# Patient Record
Sex: MALE | Race: WHITE | HISPANIC OR LATINO | Employment: FULL TIME | URBAN - METROPOLITAN AREA
[De-identification: names, ages, dates, MRNs, and addresses within clinical notes are randomized per-mention and may not be internally consistent; named-entity substitution may affect disease eponyms.]

---

## 2017-03-15 ENCOUNTER — ALLSCRIPTS OFFICE VISIT (OUTPATIENT)
Dept: OTHER | Facility: OTHER | Age: 42
End: 2017-03-15

## 2017-03-17 ENCOUNTER — GENERIC CONVERSION - ENCOUNTER (OUTPATIENT)
Dept: OTHER | Facility: OTHER | Age: 42
End: 2017-03-17

## 2017-04-27 ENCOUNTER — ALLSCRIPTS OFFICE VISIT (OUTPATIENT)
Dept: OTHER | Facility: OTHER | Age: 42
End: 2017-04-27

## 2017-04-27 ENCOUNTER — GENERIC CONVERSION - ENCOUNTER (OUTPATIENT)
Dept: OTHER | Facility: OTHER | Age: 42
End: 2017-04-27

## 2017-12-19 ENCOUNTER — GENERIC CONVERSION - ENCOUNTER (OUTPATIENT)
Dept: OTHER | Facility: OTHER | Age: 42
End: 2017-12-19

## 2018-01-10 NOTE — RESULT NOTES
Verified Results  VAS CAROTID COMPLETE STUDY [id number] 10:17AM Samantha Ritter Order Number: [id number]     Test Name Result Flag Reference   VAS CAROTID COMPLETE STUDY (Report)     [facility]PORT   CLINICAL:   Indications: Transient cerebral ischemic attack, unspecified [G45 9]  Patient presents with a recent suspected blood clot in the brain resulting in a   transient ischemic attack  There was a PFO in his heart noted on alternative   testing; however, physician wants to confirm patency of carotid arteries  Operative History   Patient denies any cardiovascular surgery   Risk Factors   The patient has history of hyperlipidemia  Clinical   Right Pressure: 110/82 mm Hg, Left Pressure: 118/80 mm Hg  FINDINGS:      Right    Impression PSV EDV (cm/s) Direction of Flow Ratio    Dist  ICA         57     24           0 78    Mid  ICA         56     29           0 76    Prox  ICA  Normal    45     20           0 62    Dist CCA         98     28                 Mid CCA          73     17           0 73    Prox CCA         101     17                 Ext Carotid        82     14           1 12    Prox Vert         37     12 Antegrade            Subclavian        117      0                    Left     Impression PSV EDV (cm/s) Direction of Flow Ratio    Dist  ICA         54     24           0 61    Mid  ICA         63     32           0 73    Prox  ICA  Normal    47     21           0 53    Dist CCA         106     25                 Mid CCA          87     21           1 08    Prox CCA         81     21                 Ext Carotid       110     18           1 26    Prox Vert         32     12 Antegrade            Subclavian        141      0                          CONCLUSION:   Impression   RIGHT:   There is no evidence of significant stenosis noted  Vertebral artery flow is antegrade  There is no significant subclavian artery   disease     LEFT:   There is no evidence of significant stenosis noted    Vertebral artery flow is antegrade  There is no significant subclavian artery   disease        SIGNATURE:   Electronically Signed by: Jim Murray MD on 2016-03-08 03:39:43 PM

## 2018-01-13 NOTE — RESULT NOTES
Verified Results  VAS LOWER LIMB VENOUS DUPLEX STUDY, COMPLETE BILATERAL [id number] 08:55AM David Fee Order Number: [id number]     Test Name Result Flag Reference   VAS LOWER LIMB VENOUS DUPLEX STUDY, COMPLETE BILATERAL (Report)     [facility] REPORT   CLINICAL:   Indications: E78 5, Q21 1, I63 9   Patient states he had an embolic stroke  He reports they told him he has a   small hole in his heart  He does not have any complaint of leg pain or swelling  Operative History:   Patient denies any cardiovascular surgery      FINDINGS:      Segment Right      Left          Impression    Impression       CFV   Normal (Patent) Normal (Patent)             CONCLUSION:   Impression:   RIGHT LOWER LIMB:   No evidence of acute or chronic deep vein thrombosis  No evidence of superficial thrombophlebitis noted  Doppler evaluation shows a normal response to augmentation maneuvers  Popliteal, posterior tibial and anterior tibial arterial Doppler waveforms are   triphasic  LEFT LOWER LIMB:   No evidence of acute or chronic deep vein thrombosis  No evidence of superficial thrombophlebitis noted  Doppler evaluation shows a normal response to augmentation maneuvers  Popliteal, posterior tibial and anterior tibial arterial Doppler waveforms are   triphasic        SIGNATURE:   Electronically Signed by: Don José MD on 2016-03-31 11:02:28 AM

## 2018-01-14 NOTE — PROCEDURES
Procedures by Ernie Solorzano MD at 3/11/2016   9:34 AM      Author:  Ernie Solorzano MD Service:  Electrophysiology Author Type:  Physician     Filed:  3/11/2016  9:37 AM Date of Service:  3/11/2016  9:34 AM Status:  Signed     :  Ernie Solorzano MD (Physician)            Procedures      PP  LINQ    History and physical were reviewed  Patient was examined and history was reviewed  No change in patient's condition Since history and physical has been completed        The pre- operative diagnosis:    Cryptogenic Stroke    Postoperative diagnosis:    Cryptogenic stroke    Procedure:    LINQ      Surgeon: Ernie Solorzano    Assistants -none    Specimens - none    Estimated blood loss- 1 ml    Findings-none    Complications none    Anesthesia-  local lidocaine by myself      Details of the device      Sensing =0 67 mv      Description of procedure: The patient was seen before the procedure  The details of the procedure was explained and patient agreed to the same  Appropriate consent was signed  The patient was brought to the electrophysiologic laboratory  Proper time out was done  Sterile dressing and draping was done  Local lidocaine was infiltrated, in the left fourth intercostal space, about 2 cm lateral to the sternal edge  Using the stab knife an incision was made  Thereafter the  was used, moved around to form a pocket, along the long axis of the heart, in the fourth intercostal space region  Then plunger was used to deploy the device  The plunger was disengaged  and removed  The  was pulled back  Pressure was held and hemostasis was obtained  Dressing was done with Steri-Strips, Telfa and Tegaderm      Summary of the procedure: The patient came in to the laboratory for linq device placement  The device has been placed and patient tolerated the procedure well                         Angeles THEODORE    Mar 11 2016  9:38AM Dorinda Faulkner Standard Time

## 2018-01-16 NOTE — RESULT NOTES
Message   Has Melvina Bartholomew spoke to cardiology about having an implantable loop recorder placed? Verified Results  MRA CAROTIDS WO CONTRAST [id number] 03:07PM Meghan Caceres Order Number: [id number]   Performing Comments: WITH DISSECTION PROTOCOL PLEASE   - Patient Instructions: To schedule this appointment, please contact Central Scheduling at [phone]  Test Name Result Flag Reference   MRA CAROTIDS WO CONTRAST (Report)     MRA NECK WITHOUT CONTRAST     INDICATION: Stroke 3 weeks ago  COMPARISON: None  TECHNIQUE: 2D Time of Flight angiography with 3D reconstructions  Axial T1 fat sat  IMAGE QUALITY: Diagnostic  FINDINGS:     AORTIC ARCH: Normal      VISUALIZED SUBCLAVIAN ARTERIES: The subclavian arteries demonstrate normal flow-related enhancement with no stenosis  VERTEBRAL ARTERIES: Normal vertebral artery origins  The vertebral arteries are bilaterally symmetric with normal enhancement and no evidence of stenosis  Normal vertebral basilar junction  RIGHT CAROTID ARTERY: Normal common carotid artery, cervical internal carotid artery and external carotid artery  No stenosis at the bifurcation  LEFT CAROTID ARTERY: Normal common carotid artery, cervical internal carotid artery and external carotid artery  No stenosis at the bifurcation  VISUALIZED INTRACRANIAL VASCULATURE: Limited visualization of the intracranial vasculature is grossly unremarkable  NASCET criteria was used to determine the degree of internal carotid artery diameter stenosis  IMPRESSION:     Unremarkable MR angiogram of the cervical vasculature  Workstation performed: [id number]     Signed by:   Jeni Mcgraw DO   3/10/16     * MRA HEAD WO CONTRAST [id number] 03:05PM Meghan Caceres Order Number: [id number]     Test Name Result Flag Reference   MRA HEAD WO CONTRAST (Report)     MRA BRAIN     INDICATION: Stroke 3 weeks ago       COMPARISON: Prior MRI brain February 21, 2016 TECHNIQUE: Axial 3-D time-of-flight imaging with 3-D reconstructions  FINDINGS:     IMAGE QUALITY: Diagnostic  ANATOMY     INTERNAL CAROTID ARTERIES: Normal flow related enhancement of the distal cervical, petrous and cavernous segments of the internal carotid arteries  Normal ICA terminus  ANTERIOR CIRCULATION: Normal A1 segments  Normal anterior communicating artery  Normal flow-related enhancement of the anterior cerebral arteries  MIDDLE CEREBRAL ARTERY CIRCULATION: The M1 segment and middle cerebral artery branches demonstrate normal flow-related enhancement  DISTAL VERTEBRAL ARTERIES: Distal vertebral arteries are patient with a normal vertebrobasilar junction  The posterior inferior cerebellar artery origins are normal       BASILAR ARTERY: Normal      POSTERIOR CEREBRAL ARTERIES: Both posterior cerebral arteries arises from the basilar tip  Both arteries demonstrate normal flow-related enhancement  IMPRESSION:     No focal intracranial stenosis or aneurysm  Workstation performed: [id number]     Signed by:   Mandy Petit DO   3/10/16     (8) [facility] ANALYSIS [id number] 08:26AM Shikha Hicks   Performed at:  [address] [place] [address]  [id number]  : Jami Mcqueen MD, Phone:  [phone]     Test Name Result Flag Reference   PROTHROMBIN [id number] MUTATION Comment     NEGATIVENo mutation identified  Comment:A point mutation ([id number]) in the factor II (prothrombin) gene is thesecond most common cause of inherited thrombophilia  The incidence ofthis mutation in the [place]   population is about 2% and in theAfrican American population it is approximately 0 5%  This mutation israre in the [place] and  population  Being heterozygousfor a prothrombin mutation increases the risk for developing venousthrombosis about 2 to 3 times above the general population risk   Beinghomozygous for the prothrombin gene mutation increases the relativerisk for venous thrombosis further, although it is not yet known howmuch further the risk is increased  In women heterozygous for theprothrombin gene mutation, the use of estrogen containing oralcontraceptives increases the relative risk of venous thrombosis about16 times and the risk of developing cerebral thrombosis is alsosignificantly increased  In pregnancy the prothrombin gene mutationincreases risk for venous thrombosis and may increase risk forstillbirth, placental abruption, pre-eclampsia and fetal growthrestriction  If the patient possesses two or more congenital oracquired thrombophilic risk factors, the risk for thrombosis may riseto more than the sum of the risk ratios for the individual mutations  This assay detects only the prothrombin M74038I mutation and doesnot measure genetic abnormalities elsewhere in the genome  Otherthrombotic risk factors may be pursued through systematic clinicallaboratory analysis  These factors include the R506Q (Leiden)mutation in the Factor V gene, plasma homocysteine levels, as wellas testing for deficiencies of antithrombin III, protein C andprotein S    ADDITIONAL INFO Comment     Genetic Counselors are available for health care providersto discuss results at [phone]  Methodology:DNA analysis of the Factor II gene was performed by PCRamplification followed by restriction analysis  Thediagnostic sensitivity is >99% for both  All the tests mustbe combined with clinical information for the most accurateinterpretation  Molecular-based testing is highly accurate,but as in any laboratory test, diagnostic errors may occur  Poort SR, et al  Blood  1996; 97:4233-1541  Arcadio Duran EA  Circulation  2004; 635:Z35-I74  neil Quispe [facility] 1999;19:700-703  Oscar Hutchinson, PhDSuzekapil Montero, PhDSocorro Cardoso, PhD     (1) [place] [id number] 09:59AM Meghan Caceres Order Number: [id number]     Test Name Result Flag Reference   HOMOCYSTEINE 8 4 umol/L  5 3-14 2     (1) PROTEIN C ACTIVITY [id number] 09:59AM Meghan Caceres Order Number: [id number]    TW Order Number: [id number]     Test Name Result Flag Reference   PROTEIN C >150 % of Normal H      (1) FARZANEH SCREEN W/REFLEX TO TITER/PATTERN [id number] 09:59AM Meghan Kappa Order Number: [id number]     Test Name Result Flag Reference   FARZANEH SCREEN   Negative  Negative     (1) ANTICARDIOLIPIN ANTIBODY [id number] 09:59AM Meghan Vamo Order Number: [id number]    Performed at:  [address] [place] [address]  [id number]  : Emerald Lan MD, Phone:  [phone]     Test Name Result Flag Reference   CARDLIP IGA AB <9 APL U/mL  0 - 11   Negative:              <12                          Indeterminate:     12 - 20                          Low-Med Positive: >20 - 80                          High Positive:         >80   CARDLIP IGG AB 14 GPL U/mL  0 - 14   Negative:              <15                          Indeterminate:     15 - 20                          Low-Med Positive: >20 - 80                          High Positive:         >80   CARDLIP IGM AB 12 MPL U/mL  0 - 12   Negative:              <13                          Indeterminate:     13 - 20                          Low-Med Positive: >20 - 80                          High Positive:         >80     (1) PROTEIN S ANTIGEN [id number] 09:59AM Richard Teague   Performed at:  [address]  [id number]  : Luis Carney MD, Phone:  [phone]     Test Name Result Flag Reference   FREE PROTEIN S 88 %  56 - 124   TOTAL PROTEIN S 138 %  58 - 150     (1) PROTEIN S ACTIVITY [id number] 09:59AM Meghan Caceres Order Number: [id number]    Performed at:  [address]  [id number]  : Luis Carney MD, Phone:  [phone]     Test Name Result Flag Reference   PROTEIN S ACTIVITY 107 %  60 - 145     (1) ANTITHROMBIN III ANTIGEN [id number] 09:59AM Misty Weldon Order Number: [id number]    Performed at:  [address]  [id number]  : Pj Hernandez MD, Phone:  [phone]     Test Name Result Flag Reference   ANTITHROMBIN III ANTIGEN 115 %  75 - 130     (1) LUPUS ANTICOAGULANT PROFILE [id number] 09:59AM Misty Weldon Order Number: [id number]    Performed at:  [address]  [id number]  : Pj Hernandez MD, Phone:  [phone]     Test Name Result Flag Reference   PTT LUPUS ANTICOAGULANT 34 2 sec  0 0 - 50 0   DILUTE GOPAL VIPER VENOM TIME 45 6 sec  0 0 - 55 1   DILUTE PROTHROMBIN TIME(DPT) 51 7 sec  0 0 - 55 0   THROMBIN TIME (DRVW) 16 6 sec  0 0 - 20 0   DPT CONFIRM RATIO 1 14 Ratio  0 00 - 1 20   **Effective March 28, 2016 the reference interval**  for dPT Confirm Ratio will be changing to:                                           0 00 - 1 40   LUPUS REFLEX INTERPRETATION Comment:     No lupus anticoagulant was detected  (1) FACTOR V LEIDEN MUTATION DNA ANALYSIS [id number] 09:59AM Mistysergei Weldon Order Number: [id number]    Performed at:  [address] [place] [address]  [id number]  : Xi Conti MD, Phone:  [phone]     Test Name Result Flag Reference   FACTOR V LEIDEN Comment     Result:  Negative (no mutation found)Factor V Leiden is a specific mutation (R506Q) in the factorV gene that is associated with an increased risk of venousthrombosis  Factor V Leiden is more resistant toinactivation by activated protein C  As a result, factor Vpersists in the circulation leading to a mild hyper-coagulable state  The Leiden mutation accounts for 90% -95% of APC resistance    Factor V Leiden has been reported inpatients with deep vein thrombosis, pulmonary embolus,central retinal vein occlusion, cerebral sinus thrombosisand hepatic vein thrombosis  Other risk factors to beconsidered in the workup for venous thrombosis include [id number] mutation in the factor II (prothrombin) gene,protein S and C deficiency, and antithrombin deficiencies  Anticardiolipin antibody and lupus anticoagulant analysismay be appropriate for certain patients, as well ashomocysteine levels  Contact your local [facility] for information on how to orderadditional testing if desired  FACTOR V LEIDEN INTERPRETATION Comment     **Genetic counselors are available for health care providers to**  discuss results at [phone]  Methodology:DNA analysis of the Factor V gene was performed by allele-specificPCR  The diagnostic sensitivity and specificity is >99% for both  Molecular-based testing is highly accurate, but as in any laboratorytest, diagnostic errors may occur  All test results must be combinedwith clinical information for the most accurate interpretation  References:Segun SPEARS (1996)  Clin Lab Med 80:492-193  Daniel Dickinson, PhDCody Torres, PhD       Discussion/Summary   This lab work came back fine  No clear evidence of a hypercoaguable state  I know that the initial Holter monitoring was negative as well         Signatures   Electronically signed by : Maki Lagos MD; Mar 15 2016 12:49PM EST                       (Author)

## 2018-01-16 NOTE — RESULT NOTES
Verified Results  * MRI BRAIN IAC WO AND W CONTRAST [id number] 09:29AM [facility]     Test Name Result Flag Reference   MRI BRAIN IAC WO AND W CONTRAST (Report)     MRI BRAIN AND IAC'S - WITH AND WITHOUT CONTRAST     INDICATION: Severe right  Orbital headache, associated with dizziness and loss of flow lateral left visual field, word finding difficulty     COMPARISON: None  TECHNIQUE:   Brain:  Sagittal T1, axial T2, axial [Gradient], axial T1, axial FLAIR, axial diffusion imaging  Axial T1 postcontrast  [Volume]  IAC'S: Coronal FIESTA, coronal T1 postcontrast, axial T1 postcontrast with fat suppression  Targeted images of the IAC'S were performed requiring additional time at acquisition and interpretation of approximately 25%   10 mL of Gadavist was injected intravenously without immediate consequence  IMAGE QUALITY:  Diagnostic  FINDINGS:     BRAIN PARENCHYMA: Ill-defined 12 mm focus of restricted diffusion and enhancement, elevated long TR signal within the medial aspect of the anterior right thalamus  There is a 2nd, discontiguous subcortical focus of high signal on long TR images in the    subcortical posterior right temporal lobe  IAC'S: No CP angle mass or abnormal enhancement  Normal aeration of the mastoid air cells and middle ear cavity  VENTRICLES: Normal      SELLA AND PITUITARY GLAND: Normal      ORBITS: Normal      PARANASAL SINUSES: Normal      VASCULATURE: Evaluation of the major intracranial vasculature demonstrates appropriate flow voids  CALVARIUM AND SKULL BASE: Normal      EXTRACRANIAL SOFT TISSUES: Normal        IMPRESSION:     Ill-defined 12 mm focus of enhancement affecting the anteromedial right thalamus  Restricted diffusion suggesting recent ischemia  Subcortical focus of high signal in enhancement without diffusion pathology in the posterior right temporal lobe, query    late subacute ischemia  Less likely etiology demyelinating disease    Repeat MR with contrast suggested in 6 weeks, to evaluate for change  Differential diagnosis includes atypical demyelinating disease  ##sigslh##sigslh       Workstation performed: [id number]     Signed by:    Trevor Land MD   2/22/16

## 2018-01-16 NOTE — MISCELLANEOUS
Message   Recorded as Task   Date: 02/18/2016 12:25 PM, Created By: Mary Reilly   Task Name: Follow Up   Assigned To: Pablo Moseley   Regarding Patient: Zane Ross, Status: Active   Comment:    Ghassan Esquivel - 18 Feb 2016 12:25 PM     TASK CREATED  please call pt for update re dizziness etc (I saw him [facility] 2/15)   Pablo Moseley - 19 Feb 2016 4:50 PM     TASK EDITED  I spoke with pt and he is not improved, feels like he does when he takes nyquil, feels like a hangover with nausea and dizziness  The 1st couple of hrs after awakening he is ok but after that s/s recur  Pt states he loses the ability to concentrate and s/s are better when he is walking: worse when he is sitting or resting  Ghassan Esquivel - 19 Feb 2016 4:53 PM     TASK REPLIED TO: Previously Assigned To Allie Hodges MRI brain   Allie Hodges - 19 Feb 2016 5:00 PM     TASK EDITED  Pt notified  Active Problems    1  Anxiety (300 00) (F41 9)   2  Chest pain (786 50) (R07 9)   3  Dizziness (780 4) (R42)   4  Dyslipidemia (272 4) (E78 5)   5  Headache (784 0) (R51)   6  Sleep apnea, obstructive (327 23) (G47 33)   7  Snoring (786 09) (R06 83)   8  Transient vision disturbance, left (368 9) (H53 9)   9  Word finding difficulty (V40 1) (R47 89)    Allergies    1   No Known Drug Allergies    Signatures   Electronically signed by : Severo Michael, ; Feb 19 2016  5:01PM EST                       (Author)

## 2018-01-18 NOTE — MISCELLANEOUS
Ashanti Wright,     Dr Jovita Pierre has tried to contact you multiple times with no respose  If you could please contact our office and leave a phone number that Dr Jovita Pierre would be able to reach you at, that would be appreciated  Dr Jovita Pierre would like to discuss plan of care along with possible medication changes  Please call our office at [phone] and ask for Franchesca Wade  Thank you,  [facility]           Electronically signed by: Franchesca Polk   Mar 17 2017  9:54AM EST Author

## 2018-01-22 VITALS
BODY MASS INDEX: 41.53 KG/M2 | HEIGHT: 64 IN | HEART RATE: 68 BPM | SYSTOLIC BLOOD PRESSURE: 118 MMHG | WEIGHT: 243.25 LBS | DIASTOLIC BLOOD PRESSURE: 64 MMHG

## 2018-03-07 NOTE — PROGRESS NOTES
"  Discussion/Summary  Normal device function      Results/Data  Results   Cardiac Device Remote [id number] 02:06PM Darlene Chacon     Test Name Result Flag Reference   MISCELLANEOUS COMMENT      CARELINK TRANSMISSION: (LOOP) Rhina Garcia PATIENT OR DEVICE ACTIVATED EPISODES  BATTERY STATUS "OK"  --NUR   Cardiac Electrophysiology Report      [id number]}  pdf   DEVICE TYPE Monitor       Cardiac Electrophysiology Report [id number] 02:06PM Darlene Chacon     Test Name Result Flag Reference   Cardiac Electrophysiology Report      [id number]  pdf     Signatures   Electronically signed by : Amna Bar, ; Rex 10 2016  8:01AM EST                       (Author)    Electronically signed by : SANTINO Ji ; Jun 12 2016 12:51PM EST                       (Author)    "

## 2018-03-07 NOTE — PROGRESS NOTES
"  Discussion/Summary  Normal device function      Results/Data  Results   Cardiac Device Remote [id number] 02:39PM Will Lobstein     Test Name Result Flag Reference   MISCELLANEOUS COMMENT      CARELINK TRANSMISSION: (LOOP) Soco Guzman PATIENT OR DEVICE ACTIVATED EPISODES  BATTERY STATUS "OK" ---NUR   Cardiac Electrophysiology Report      [id number]}  pdf   DEVICE TYPE Monitor       Cardiac Electrophysiology Report [id number] 02:39PM Will Lobstein     Test Name Result Flag Reference   Cardiac Electrophysiology Report      [id number]  pdf     Signatures   Electronically signed by : Salina Pedersen, ; Jul 14 2016  8:29AM EST                       (Author)    Electronically signed by : SANTINO Diallo ; Jul 14 2016  9:58AM EST                       (Author)    "

## 2018-03-07 NOTE — PROGRESS NOTES
"  Discussion/Summary  Normal device function      Results/Data  Results   Cardiac Device Remote 28Apr2016 11:54PM Aaliyah Hammonds     Test Name Result Flag Reference   MISCELLANEOUS COMMENT      CARELINK TRANSMISSION: LOOP RECORDER  PRESENTING RHYTHM VS @ 98 BPM  BATTERY STATUS "OK"  1 PT ACTIVATED EPISODE ALREADY ADDRESSED  NO DEVICE DETECTED EPISODES  NORMAL DEVICE FUNCTION  DL/CP   Cardiac Electrophysiology Report      [id number]}  pdf   DEVICE TYPE Monitor       Cardiac Electrophysiology Report [id number] 11:54PM Aaliyah Hammonds     Test Name Result Flag Reference   Cardiac Electrophysiology Report      [id number]  pdf     Signatures   Electronically signed by : Natasha Carbone, ; May  3 2016 11:00AM EST                       (Author)    Electronically signed by : Diony Alberto DO; Jun 5 2016  7:45PM EST                       (Author)    "

## 2018-03-07 NOTE — PROGRESS NOTES
History of Present Illness    Revaccination   Vaccine Information: Vaccine(s) Given (names): adacel 11/17/2015  Spoke with patient regarding risks and benefits of revaccination  Action(s): Pt will be revaccinated  Appointment scheduled: [id number] pp  No Show Appt(s): [id number]  Revaccination Completed: [id number]  Active Problems     1  Anxiety (300 00) (F41 9)   2  Dyslipidemia (272 4) (E78 5)   3  Headache (784 0) (R51)   4  Need for revaccination (V05 9) (Z23)   5  Sleep apnea, obstructive (327 23) (G47 33)   6  Snoring (786 09) (R06 83)    Chest pain (786 50) (R07 9)       Dizziness (780 4) (R42)       Word finding difficulty (V40 1) (R47 89)       Transient vision disturbance, left (368 9) (H53 9)       Taste impairment (781 1) (R43 2)       Palpitations (785 1) (R00 2)       PFO (patent foramen ovale) (745 5) (Q21 1)          Immunizations  Influenza --- Conchis Forte: 17-Nov-2015   Tdap --- Conchis Forte: 17-Nov-2015     Current Meds   1  Atorvastatin Calcium 20 MG Oral Tablet; TAKE 1 TABLET DAILY   2  Ecotrin 325 MG Oral Tablet Delayed Release; Take 1 tablet daily   3  Escitalopram Oxalate 5 MG Oral Tablet; TAKE 1 TABLET DAILY    Allergies    1  No Known Drug Allergies    Plan    1  RVAC-Adacel 5-2-15 5 LF-MCG/0 5 Intramuscular Suspension    Future Appointments    Date/Time Provider Specialty Site   06/19/2017 09:30 AM Antonietta Petit MD Neurology [facility]  Robbie Hudson   06/15/2017 10:30 AM Cardiology, Device Remote  ST [address] [place] [address] Av   04/27/2018 08:15 AM Kj Sauceda DO Family Medicine [facility] FAMILY [facility]     Signatures   Electronically signed by : Airam Grant DO;  Apr 27 2017 11:48AM EST                       (Author)

## 2018-03-07 NOTE — PROGRESS NOTES
"  Discussion/Summary  Normal device function     Probably sinus tachycardia with transition to SVT then out  not afib  Results/Data  Cardiac Device Remote [id number] 12:36AM Azar Gottlieb     Test Name Result Flag Reference   MISCELLANEOUS COMMENT      CARELINK TRANSMISSION: LOOP RECORDER  PRESENTING RHYTHM NSR @ 82 BPM  BATTERY STATUS "OK"  1 TACHY EPISODE W/ EGRAMS SHOWING SVT @ 207 BPM FOR 24 SECS  NO BB  TASKED TO DR Lindy Potter DUE TO HEART RATE  NO PATIENT ACTIVATED EPISODES  NORMAL DEVICE FUNCTION  DL   Cardiac Electrophysiology Report      [id number]  pdf   DEVICE TYPE Monitor       Cardiac Electrophysiology Report [id number] 12:36AM Azar Gottlieb     Test Name Result Flag Reference   Cardiac Electrophysiology Report      [id number]  pdf     Signatures   Electronically signed by : Toñito Cartagena, ; Mar 16 2017 11:05AM EST                       (Author)    Electronically signed by : Re March, DO;  Apr 1 2017 12:51PM EST                       (Author)    "

## 2018-03-07 NOTE — PROGRESS NOTES
"  Discussion/Summary  Normal device function      Results/Data  Results   Cardiac Device In Clinic 21Mar2016 11:59AM Sari Leyden     Test Name Result Flag Reference   MISCELLANEOUS COMMENT (Report)     DEVICE INTERROGATED IN THE [place] OFFICE: BATTERY VOLTAGE ADEQUATE  NO PT ACTIVATED OR DEVICE DETECTED EPISODES  NORMAL DEVICE FUNCTION  Aaron Dorado CHECK: INCISION CLEAN AND DRY WITH EDGES APPROXIMATED; WOUND CARE AND RESTRICTIONS REVIEWED WITH PATIENT  NC   Cardiac Electrophysiology Report      [id number]  pdf   DEVICE TYPE Monitor       Cardiac Electrophysiology Report 21Mar2016 11:59AM Sari Leyden     Test Name Result Flag Reference   Cardiac Electrophysiology Report      [id number]  pdf     Signatures   Electronically signed by : Behzad Bah, ; Mar 21 2016 10:52AM EST                       (Author)    Electronically signed by : SANTINO Nuñez ; Mar 21 2016  3:59PM EST                       (Author)    "

## 2018-03-07 NOTE — PROGRESS NOTES
"  Discussion/Summary  Normal device function      Results/Data  Cardiac Device Remote [id number] 03:37PM Miles Orlando     Test Name Result Flag Reference   MISCELLANEOUS COMMENT      CARELINK TRANSMISSION: LOOP RECORDER  PRESENTING RHYTHM NSR @ 76 BPM  BATTERY STATUS "OK"  NO PATIENT OR DEVICE ACTIVATED EPISODES  NORMAL DEVICE FUNCTION  DL   Cardiac Electrophysiology Report      [id number]  pdf   DEVICE TYPE Monitor       Cardiac Electrophysiology Report [id number] 03:37PM Miles Orlando     Test Name Result Flag Reference   Cardiac Electrophysiology Report      [id number]  pdf     Signatures   Electronically signed by : Chelle Thakur, ; Dec  6 2016 12:28PM EST                       (Author)    Electronically signed by : Patrick Hunt DO; Dec 10 2016  2:59PM EST                       (Author)    "

## 2018-04-26 PROBLEM — E66.01 OBESITY, CLASS III, BMI 40-49.9 (MORBID OBESITY) (HCC): Status: ACTIVE | Noted: 2017-04-27

## 2018-04-26 PROBLEM — E66.813 OBESITY, CLASS III, BMI 40-49.9 (MORBID OBESITY): Status: ACTIVE | Noted: 2017-04-27

## 2018-05-23 ENCOUNTER — APPOINTMENT (EMERGENCY)
Dept: CT IMAGING | Facility: HOSPITAL | Age: 43
End: 2018-05-23

## 2018-05-23 ENCOUNTER — HOSPITAL ENCOUNTER (EMERGENCY)
Facility: HOSPITAL | Age: 43
Discharge: HOME | End: 2018-05-23
Attending: EMERGENCY MEDICINE | Admitting: EMERGENCY MEDICINE

## 2018-05-23 VITALS
TEMPERATURE: 97.5 F | HEART RATE: 60 BPM | DIASTOLIC BLOOD PRESSURE: 87 MMHG | SYSTOLIC BLOOD PRESSURE: 129 MMHG | RESPIRATION RATE: 15 BRPM | OXYGEN SATURATION: 98 %

## 2018-05-23 DIAGNOSIS — S09.90XA CLOSED HEAD INJURY, INITIAL ENCOUNTER: Primary | ICD-10-CM

## 2018-05-23 DIAGNOSIS — S16.1XXA STRAIN OF NECK MUSCLE, INITIAL ENCOUNTER: ICD-10-CM

## 2018-05-23 PROCEDURE — 99284 EMERGENCY DEPT VISIT MOD MDM: CPT

## 2018-05-23 PROCEDURE — 72125 CT NECK SPINE W/O DYE: CPT

## 2018-05-23 PROCEDURE — 70450 CT HEAD/BRAIN W/O DYE: CPT

## 2018-05-23 RX ORDER — NAPROXEN 500 MG/1
500 TABLET ORAL 2 TIMES DAILY WITH MEALS
Qty: 20 TABLET | Refills: 0 | Status: SHIPPED | OUTPATIENT
Start: 2018-05-23 | End: 2022-01-11 | Stop reason: ALTCHOICE

## 2018-05-23 NOTE — ED NOTES
Hematoma noted to right posterior base of head, C-collar place until cleared by physician        Alyssa Wolf, ABDELRAHMAN  05/23/18 2189

## 2018-05-23 NOTE — DISCHARGE INSTRUCTIONS
Cervical Strain   WHAT YOU NEED TO KNOW:   A cervical strain is a stretched or torn muscle or tendon in your neck  Tendons are strong tissues that connect muscles to bones  Common causes of cervical strains include a car accident, a fall, or a sports injury  DISCHARGE INSTRUCTIONS:   Return to the emergency department if:   · You have pain or numbness from your shoulder down to your hand  · You have problems with your vision, hearing, or balance  · You feel confused or cannot concentrate  · You have problems with movement and strength  Contact your healthcare provider if:   · You have increased swelling or pain in your neck  · You have questions or concerns about your condition or care  Medicines: You may need any of the following:  · Acetaminophen  decreases pain and fever  It is available without a doctor's order  Ask how much to take and how often to take it  Follow directions  Read the labels of all other medicines you are using to see if they also contain acetaminophen, or ask your doctor or pharmacist  Acetaminophen can cause liver damage if not taken correctly  Do not use more than 4 grams (4,000 milligrams) total of acetaminophen in one day  · NSAIDs , such as ibuprofen, help decrease swelling, pain, and fever  This medicine is available with or without a doctor's order  NSAIDs can cause stomach bleeding or kidney problems in certain people  If you take blood thinner medicine, always ask your healthcare provider if NSAIDs are safe for you  Always read the medicine label and follow directions  · Muscle relaxers  help decrease pain and muscle spasms  · Prescription pain medicine  may be given  Ask your healthcare provider how to take this medicine safely  Some prescription pain medicines contain acetaminophen  Do not take other medicines that contain acetaminophen without talking to your healthcare provider  Too much acetaminophen may cause liver damage   Prescription pain medicine may cause constipation  Ask your healthcare provider how to prevent or treat constipation  · Take your medicine as directed  Contact your healthcare provider if you think your medicine is not helping or if you have side effects  Tell him or her if you are allergic to any medicine  Keep a list of the medicines, vitamins, and herbs you take  Include the amounts, and when and why you take them  Bring the list or the pill bottles to follow-up visits  Carry your medicine list with you in case of an emergency  Manage your symptoms:   · Apply heat  on your neck for 15 to 20 minutes, 4 to 6 times a day or as directed  Heat helps decrease pain, stiffness, and muscle spasms  · Begin gentle neck exercises  as soon as you can move your neck without pain  Exercises will help decrease stiffness and improve the strength and movement of your neck  Ask your healthcare provider what kind of exercises you should do  · Gradually return to your usual activities as directed  Stop if you have pain  Avoid activities that can cause more damage to your neck, such as heavy lifting or strenuous exercise  · Sleep without a pillow  to help decrease pain  Instead, roll a small towel tightly and place it under your neck  · Go to physical therapy as directed  A physical therapist teaches you exercises to help improve movement and strength, and to decrease pain  Prevent neck injury:   · Drive safely  Make sure everyone in your car wears a seatbelt  A seatbelt can save your life if you are in an accident  Do not use your cell phone when you are driving  This could distract you and cause an accident  Pull over if you need to make a call or send a text message  · Wear helmets, lifejackets, and protective gear  Always wear a helmet when you ride a bike or motorcycle, go skiing, or play sports that could cause a head injury  Wear protective equipment when you play sports   Wear a lifejacket when you are on a boat or doing water sports  Follow up with your healthcare provider as directed: You may be referred to an orthopedist or physical therapies  Write down your questions so you remember to ask them during your visits  © 2017 [address] [employer] Information is for End User's use only and may not be sold, redistributed or otherwise used for commercial purposes  All illustrations and images included in CareNotes® are the copyrighted property of A [employer] M , Inc  or Sharad Jamil  The above information is an  only  It is not intended as medical advice for individual conditions or treatments  Talk to your doctor, nurse or pharmacist before following any medical regimen to see if it is safe and effective for you  Head Injury   WHAT YOU NEED TO KNOW:   A head injury is most often caused by a blow to the head  This may occur from a fall, bicycle injury, sports injury, being struck in the head, or a motor vehicle accident  DISCHARGE INSTRUCTIONS:   Call 911 or have someone else call for any of the following:   · You cannot be woken  · You have a seizure  · You stop responding to others or you faint  · You have blurry or double vision  · Your speech becomes slurred or confused  · You have arm or leg weakness, loss of feeling, or new problems with coordination  · Your pupils are larger than usual or one pupil is a different size than the other  · You have blood or clear fluid coming out of your ears or nose  Return to the emergency department if:   · You have repeated or forceful vomiting  · You feel confused  · Your headache gets worse or becomes severe  · You or someone caring for you notices that you are harder to wake than usual   Contact your healthcare provider if:   · Your symptoms last longer than 6 weeks after the injury  · You have questions or concerns about your condition or care  Medicines:   · Acetaminophen  decreases pain   Acetaminophen is available without a doctor's order  Ask how much to take and how often to take it  Follow directions  Acetaminophen can cause liver damage if not taken correctly  · Take your medicine as directed  Contact your healthcare provider if you think your medicine is not helping or if you have side effects  Tell him or her if you are allergic to any medicine  Keep a list of the medicines, vitamins, and herbs you take  Include the amounts, and when and why you take them  Bring the list or the pill bottles to follow-up visits  Carry your medicine list with you in case of an emergency  Self-care:   · Rest  or do quiet activities for 24 to 48 hours  Limit your time watching TV, using the computer, or doing tasks that require a lot of thinking  Slowly return to your normal activities as directed  Do not play sports or do activities that may cause you to get hit in the head  Ask your healthcare provider when you can return to sports  · Apply ice  on your head for 15 to 20 minutes every hour or as directed  Use an ice pack, or put crushed ice in a plastic bag  Cover it with a towel before you apply it to your skin  Ice helps prevent tissue damage and decreases swelling and pain  · Have someone stay with you for 24 hours  or as directed  This person can monitor you for complications and call 179  When you are awake the person should ask you a few questions to see if you are thinking clearly  An example would be to ask your name or your address  Prevent another head injury:   · Wear a helmet that fits properly  Do this when you play sports, or ride a bike, scooter, or skateboard  Helmets help decrease your risk of a serious head injury  Talk to your healthcare provider about other ways you can protect yourself if you play sports  · Wear your seat belt every time you are in a car  This helps to decrease your risk for a head injury if you are in a car accident    Follow up with your healthcare provider as directed:  Write down your questions so you remember to ask them during your visits  © 2017 [address] [employer] Information is for End User's use only and may not be sold, redistributed or otherwise used for commercial purposes  All illustrations and images included in CareNotes® are the copyrighted property of [employer] Inc  or Sharad Jamil  The above information is an  only  It is not intended as medical advice for individual conditions or treatments  Talk to your doctor, nurse or pharmacist before following any medical regimen to see if it is safe and effective for you

## 2018-05-23 NOTE — ED PROVIDER NOTES
History  Chief Complaint   Patient presents with   Ilya Miyamoto Fall     pt presents s/p fall this morning, reports stepping out of shower and slipping on tile falling backwards and hitting head on edge of tube  pt reports head pain denies neck pain  denies LOC reports taking aspirin 81mg daily  Pt  Stepped out of the tub and fell back striking the back of his head on the tub edge  +posterior head pain 5/10 and upper neck pain  No LOC, but pt  [place] dazed for a few seconds afterwards, no n/v  No change in mental status  Denies any other injuries  Prior to Admission Medications   Prescriptions Last Dose Informant Patient Reported? Taking? Multiple Vitamin (MULTI-VITAMIN DAILY PO)   Yes Yes   Sig: Take 1 tablet by mouth daily   aspirin (ECOTRIN) 325 mg EC tablet   Yes Yes   Sig: Take 1 tablet by mouth daily   escitalopram (LEXAPRO) 5 mg tablet   Yes Yes   Sig: Take 1 tablet by mouth daily      Facility-Administered Medications: None       Past Medical History:   Diagnosis Date    Anxiety     Depression     Elevated cholesterol     Irregular heart beat     Palpitations     RESOLVED: [id number]    Taste impairment     RESOLVED: [id number]    TIA (transient ischemic attack)     TIA (transient ischemic attack)     Transient vision disturbance, left     RESOLVED: [id number]       History reviewed  No pertinent surgical history  Family History   Problem Relation Age of Onset    Coronary artery disease Mother     Other Mother      STENT    Heart disease Father     Other Father      STENT    Cancer Maternal Grandmother     Cancer Paternal Grandmother     Stroke Paternal Grandfather     Heart failure Paternal Uncle     Heart failure Maternal Aunt      I have reviewed and agree with the history as documented      Social History   Substance Use Topics    Smoking status: Never Smoker    Smokeless tobacco: Never Used    Alcohol use Yes      Comment: MINIMAL        Review of Systems   Constitutional: Negative for appetite change, fatigue and fever  HENT: Negative for rhinorrhea and sore throat  Respiratory: Negative for cough, shortness of breath and wheezing  Cardiovascular: Negative for chest pain and leg swelling  Gastrointestinal: Negative for abdominal pain, diarrhea and vomiting  Genitourinary: Negative for dysuria and flank pain  Musculoskeletal: Positive for neck pain  Negative for back pain  Skin: Negative for rash  Neurological: Positive for headaches  Negative for syncope  Psychiatric/Behavioral:        Mood normal       Physical Exam  Physical Exam   Constitutional: He is oriented to person, place, and time  He appears well-developed and well-nourished  HENT:   Head: Normocephalic and atraumatic  Mouth/Throat: Oropharynx is clear and moist    Posterior scalp tenderness, no abasions   Eyes: Pupils are equal, round, and reactive to light  Neck: Neck supple  c-collar on, upper cspine tenderness, no stepoffs   Cardiovascular: Normal rate and regular rhythm  Pulmonary/Chest: Effort normal and breath sounds normal    Abdominal: Soft  There is no tenderness  Musculoskeletal: Normal range of motion  No other spinal tenderness, all ext  FROM, nontender   Neurological: He is alert and oriented to person, place, and time  No cranial nerve deficit  Skin: Skin is warm and dry  Psychiatric: He has a normal mood and affect  Nursing note and vitals reviewed        Vital Signs  ED Triage Vitals [05/23/18 0837]   Temperature Pulse Respirations Blood Pressure SpO2   97 5 °F (36 4 °C) 62 17 123/88 98 %      Temp Source Heart Rate Source Patient Position - Orthostatic VS BP Location FiO2 (%)   Oral Monitor Sitting Right arm --      Pain Score       --           Vitals:    05/23/18 0837 05/23/18 0937   BP: 123/88 129/87   Pulse: 62 60   Patient Position - Orthostatic VS: Sitting Sitting       Visual Acuity  Visual Acuity      Most Recent Value   L Pupil Size (mm)  4   R Pupil Size (mm)  4          ED Medications  Medications - No data to display    Diagnostic Studies  Results Reviewed     None                 CT cervical spine without contrast   Final Result by Shane Welsh MD (05/23 1019)      No cervical spine fracture or traumatic malalignment  Workstation performed: [id number]         CT head without contrast   Final Result by Shane Welsh MD (05/23 1016)      No intracranial hemorrhage or calvarial fracture  Occipital scalp swelling, consistent with known trauma  Workstation performed: [id number]                    Procedures  Procedures       Phone Contacts  ED Phone Contact    ED Course                               MDM  Number of Diagnoses or Management Options  Closed head injury, initial encounter:   Strain of neck muscle, initial encounter:      Amount and/or Complexity of Data Reviewed  Tests in the radiology section of CPT®: ordered and reviewed    Risk of Complications, Morbidity, and/or Mortality  Presenting problems: moderate      CritCare Time    Disposition  Final diagnoses:   Closed head injury, initial encounter   Strain of neck muscle, initial encounter     Time reflects when diagnosis was documented in both MDM as applicable and the Disposition within this note     Time User Action Codes Description Comment    5/23/2018 10:30 AM Thomas Pizarro Add [S09 90XA] Closed head injury, initial encounter     5/23/2018 10:30 AM Carolyn Pizarro  1XXA] Strain of neck muscle, initial encounter       ED Disposition     ED Disposition Condition Comment    Discharge  Hazel Raymond discharge to home/self care  Condition at discharge: Stable        Follow-up Information     Follow up With Specialties Details Why [facility],  Family Medicine   [address]    [address] [facility]  [phone]            Discharge Medication List as of 5/23/2018 10:31 AM      CONTINUE these medications which have NOT CHANGED    Details   aspirin (ECOTRIN) 325 mg EC tablet Take 1 tablet by mouth daily, Starting Mon 2/22/2016, Historical Med      escitalopram (LEXAPRO) 5 mg tablet Take 1 tablet by mouth daily, Starting Fri 7/21/2017, Historical Med      Multiple Vitamin (MULTI-VITAMIN DAILY PO) Take 1 tablet by mouth daily, Starting Thu 4/27/2017, Historical Med           No discharge procedures on file      ED Provider  Electronically Signed by           David Hernandez MD  05/26/18 8911

## 2019-11-22 ENCOUNTER — TELEPHONE (OUTPATIENT)
Dept: NEUROLOGY | Facility: CLINIC | Age: 44
End: 2019-11-22

## 2022-01-03 ENCOUNTER — TELEPHONE (OUTPATIENT)
Dept: FAMILY MEDICINE CLINIC | Facility: CLINIC | Age: 47
End: 2022-01-03

## 2022-01-03 DIAGNOSIS — Z20.822 EXPOSURE TO COVID-19 VIRUS: Primary | ICD-10-CM

## 2022-01-03 NOTE — TELEPHONE ENCOUNTER
WIFE CALLED SAID  NEEDS COVID TEST ORDERS PLACED WAS EXPOSED 5DAYS AGO TO [facility]  WIFE NEEDS A CALL TO LET HER KNOW ORDERS WERE PLACED

## 2022-01-03 NOTE — TELEPHONE ENCOUNTER
Order placed  Please call patient and let him know  Patient could potentially be billed $99 due to having no sx  Additionally, this person has not been seen in our office since at least 2018  Patient needs to schedule a physical appointment within the next few weeks

## 2022-01-03 NOTE — TELEPHONE ENCOUNTER
Mother was called and informed on her covid test and her family test that had been placed as well for them

## 2022-01-11 ENCOUNTER — OFFICE VISIT (OUTPATIENT)
Dept: FAMILY MEDICINE CLINIC | Facility: CLINIC | Age: 47
End: 2022-01-11
Payer: COMMERCIAL

## 2022-01-11 VITALS
DIASTOLIC BLOOD PRESSURE: 80 MMHG | WEIGHT: 247 LBS | TEMPERATURE: 97.5 F | HEIGHT: 65 IN | HEART RATE: 60 BPM | SYSTOLIC BLOOD PRESSURE: 110 MMHG | OXYGEN SATURATION: 99 % | BODY MASS INDEX: 41.15 KG/M2

## 2022-01-11 DIAGNOSIS — G47.33 OBSTRUCTIVE SLEEP APNEA ON CPAP: ICD-10-CM

## 2022-01-11 DIAGNOSIS — Z12.11 COLON CANCER SCREENING: ICD-10-CM

## 2022-01-11 DIAGNOSIS — Z86.73 HISTORY OF EMBOLIC STROKE: ICD-10-CM

## 2022-01-11 DIAGNOSIS — Q21.1 PFO (PATENT FORAMEN OVALE): ICD-10-CM

## 2022-01-11 DIAGNOSIS — E66.01 OBESITY, CLASS III, BMI 40-49.9 (MORBID OBESITY) (HCC): ICD-10-CM

## 2022-01-11 DIAGNOSIS — E78.5 DYSLIPIDEMIA: ICD-10-CM

## 2022-01-11 DIAGNOSIS — R51.9 NONINTRACTABLE HEADACHE, UNSPECIFIED CHRONICITY PATTERN, UNSPECIFIED HEADACHE TYPE: ICD-10-CM

## 2022-01-11 DIAGNOSIS — Z99.89 OBSTRUCTIVE SLEEP APNEA ON CPAP: ICD-10-CM

## 2022-01-11 DIAGNOSIS — K21.9 GASTROESOPHAGEAL REFLUX DISEASE WITHOUT ESOPHAGITIS: ICD-10-CM

## 2022-01-11 DIAGNOSIS — Z00.00 ENCOUNTER FOR ANNUAL PHYSICAL EXAM: Primary | ICD-10-CM

## 2022-01-11 PROCEDURE — 99396 PREV VISIT EST AGE 40-64: CPT | Performed by: FAMILY MEDICINE

## 2022-01-11 PROCEDURE — 3725F SCREEN DEPRESSION PERFORMED: CPT | Performed by: FAMILY MEDICINE

## 2022-01-11 PROCEDURE — 3008F BODY MASS INDEX DOCD: CPT | Performed by: FAMILY MEDICINE

## 2022-01-11 PROCEDURE — 1036F TOBACCO NON-USER: CPT | Performed by: FAMILY MEDICINE

## 2022-01-11 RX ORDER — LORATADINE 10 MG/1
10 TABLET ORAL DAILY
COMMUNITY

## 2022-01-11 RX ORDER — CALCIUM CARBONATE 200(500)MG
1 TABLET,CHEWABLE ORAL DAILY PRN
COMMUNITY

## 2022-01-11 NOTE — PROGRESS NOTES
FAMILY PRACTICE OFFICE VISIT  [facility]  [address]  [address], [place], [address]      NAME: Domonique Shipley  AGE: 55 y o  SEX: male  : 1975   MRN: [id number]    DATE: 2022  TIME: 12:01 PM    Assessment and Plan     Problem List Items Addressed This Visit     History of embolic stroke 0165    Obstructive sleep apnea on CPAP    Relevant Orders    TSH, 3rd generation with Free T4 reflex    PFO (patent foramen ovale)    Obesity, Class III, BMI 40-49 9 (morbid obesity) ([facility] [address] )    Relevant Orders    TSH, 3rd generation with Free T4 reflex    Headache    Dyslipidemia    Relevant Orders    TSH, 3rd generation with Free T4 reflex    Lipid panel    Comprehensive metabolic panel    Gastroesophageal reflux disease without esophagitis    Relevant Medications    calcium carbonate (TUMS) 500 mg chewable tablet      Other Visit Diagnoses     Encounter for annual physical exam    -  Primary    Colon cancer screening        Relevant Orders    Cologuard          Patient Instructions     Reviewed health history, I had last seen him in 2017  He has been motivated the past few weeks to work on his weight, he has been limiting portions, limiting sweetened drinks, working on weight loss  He should exercise as tolerated  Does have plantar fasciitis  Had embolic stroke back in , had JULES in 2016 showing tiny PFO, mild right to left shunt, stay on aspirin as is  He did have loop recorder placed, that is inactive, he has been just observing, does not find it to be bothersome, he could see Cardiology in follow-up to discuss removal/follow-up ultrasound, he will consider  His blood pressure is excellent today  He continues on CPAP unit, he plans to get a new unit when it becomes available  We did review previous blood work, I would like him to redo blood work  Await redo lipids    With weight gain include glucose to screen for diabetes, also include TSH, fatigue probably due to apnea  Immunization History   Administered Date(s) Administered    Influenza Quadrivalent, 6-35 Months IM 11/17/2015    Tdap 11/17/2015, 04/27/2017       He does not do yearly Flu shot  Tdap/tetanus shot is up to date  (done every 10 yrs for superficial cuts, every 5 yrs for deep wounds)  Covid vaccine received  His family recently had COVID, he tested negative  He is a nonsmoker     Regarding Colon Cancer screening, we discussed Colonoscopy vs ColoGuard is indicated starting at age 36-53  Will do ColoGuard  He does have GERD on occasion, uses Tums, could use over-the-counter famotidine/Pepcid, call us if worsens, if significant worsening should do EGD  Discussed Prostate Cancer screening pros/cons starting at age 48  PSA blood test not indicated  We will see him again in 12 months, sooner as needed  Chief Complaint     Chief Complaint   Patient presents with    Physical Exam       History of Present Illness   Caitlin Bliss is a 55y o -year-old male who I had last seen in 2017, he is in today for a checkup  His weight remains high, with the new year he may to resolution to work on weight loss, has already dropped some weight  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, no chest pain, increased shortness of breath there  Does continue on CPAP unit  Has continued on aspirin, history involving stroke, has not followed up with Cardiology, does have inactive loop recorder  Review of Systems   Review of Systems   Constitutional: Positive for fatigue (Improved with CPAP unit)  Negative for appetite change, fever and unexpected weight change (Weight remains high, 240s, he thinks he was in 250s prior to the new year)  HENT: Negative for sore throat and trouble swallowing  Respiratory: Negative for cough, chest tightness, shortness of breath and wheezing      Cardiovascular: Negative for chest pain, palpitations and leg swelling  Gastrointestinal: Negative for abdominal pain, blood in stool, nausea and vomiting  No acid reflux     No change in bowel   Genitourinary: Negative for dysuria and hematuria  Neurological: Negative for dizziness, syncope, light-headedness and headaches (None current, history of headaches)  Hematological: Does not bruise/bleed easily  Psychiatric/Behavioral: Negative for behavioral problems and confusion  Active Problem List     Patient Active Problem List   Diagnosis    History of embolic stroke 9129    Snoring    Obstructive sleep apnea on CPAP    PFO (patent foramen ovale)    Obesity, Class III, BMI 40-49 9 (morbid obesity) ([address] )    Headache    Dyslipidemia    Anxiety    Gastroesophageal reflux disease without esophagitis       Past Medical History:  Reviewed    Past Surgical History:  Reviewed    Family History:  Reviewed    Social History:  Reviewed    Objective     Vitals:    01/11/22 1121   BP: 110/80   BP Location: Right arm   Patient Position: Sitting   Cuff Size: Large   Pulse: 60   Temp: 97 5 °F (36 4 °C)   SpO2: 99%   Weight: 112 kg (247 lb)   Height: 5' 4 5" (1 638 m)     Body mass index is 41 74 kg/m²  BP Readings from Last 3 Encounters:   01/11/22 110/80   05/23/18 129/87   04/27/17 118/64       Wt Readings from Last 3 Encounters:   01/11/22 112 kg (247 lb)   04/27/17 110 kg (243 lb 4 oz)   06/08/16 100 kg (221 lb 0 9 oz)       Physical Exam  Constitutional:       General: He is not in acute distress  Appearance: Normal appearance  He is well-developed  He is obese  He is not ill-appearing  HENT:      Right Ear: Tympanic membrane normal       Left Ear: Tympanic membrane normal    Eyes:      General: No scleral icterus  Neck:      Vascular: No carotid bruit  Cardiovascular:      Rate and Rhythm: Normal rate and regular rhythm  Heart sounds: Normal heart sounds  No murmur heard        Pulmonary:      Effort: Pulmonary effort is normal  No respiratory distress  Breath sounds: Normal breath sounds  Abdominal:      Palpations: Abdomen is soft  Tenderness: There is no abdominal tenderness  Musculoskeletal:      Right lower leg: No edema  Left lower leg: No edema  Lymphadenopathy:      Cervical: No cervical adenopathy  Skin:     Coloration: Skin is not jaundiced  Neurological:      Mental Status: He is alert  Mental status is at baseline  Psychiatric:         Behavior: Behavior normal          ALLERGIES:  No Known Allergies    Current Medications     Current Outpatient Medications   Medication Sig Dispense Refill    aspirin (ECOTRIN) 325 mg EC tablet Take 1 tablet by mouth daily      calcium carbonate (TUMS) 500 mg chewable tablet Chew 1 tablet daily as needed for indigestion or heartburn      loratadine (CLARITIN) 10 mg tablet Take 10 mg by mouth daily      Multiple Vitamin (MULTI-VITAMIN DAILY PO) Take 1 tablet by mouth daily       No current facility-administered medications for this visit              Orders Placed This Encounter   Procedures    Cologuard    TSH, 3rd generation with Free T4 reflex    Lipid panel    Comprehensive metabolic panel         Melecio Hendrickson DO

## 2022-01-11 NOTE — PATIENT INSTRUCTIONS
Reviewed health history, I had last seen him in 2017  He has been motivated the past few weeks to work on his weight, he has been limiting portions, limiting sweetened drinks, working on weight loss  He should exercise as tolerated  Does have plantar fasciitis  Had embolic stroke back in 0049, had JULES in 2016 showing tiny PFO, mild right to left shunt, stay on aspirin as is  He did have loop recorder placed, that is inactive, he has been just observing, does not find it to be bothersome, he could see Cardiology in follow-up to discuss removal/follow-up ultrasound, he will consider  His blood pressure is excellent today  He continues on CPAP unit, he plans to get a new unit when it becomes available  We did review previous blood work, I would like him to redo blood work  Await redo lipids  With weight gain include glucose to screen for diabetes, also include TSH, fatigue probably due to apnea  Immunization History   Administered Date(s) Administered    Influenza Quadrivalent, 6-35 Months IM 11/17/2015    Tdap 11/17/2015, 04/27/2017       He does not do yearly Flu shot  Tdap/tetanus shot is up to date  (done every 10 yrs for superficial cuts, every 5 yrs for deep wounds)  Covid vaccine received  His family recently had COVID, he tested negative  He is a nonsmoker     Regarding Colon Cancer screening, we discussed Colonoscopy vs ColoGuard is indicated starting at age 36-53  Will do ColoGuard  He does have GERD on occasion, uses Tums, could use over-the-counter famotidine/Pepcid, call us if worsens, if significant worsening should do EGD  Discussed Prostate Cancer screening pros/cons starting at age 48  PSA blood test not indicated  We will see him again in 12 months, sooner as needed

## 2022-01-13 LAB
ALBUMIN SERPL-MCNC: 4.4 G/DL (ref 3.6–5.1)
ALBUMIN/GLOB SERPL: 1.7 (CALC) (ref 1–2.5)
ALP SERPL-CCNC: 80 U/L (ref 36–130)
ALT SERPL-CCNC: 33 U/L (ref 9–46)
AST SERPL-CCNC: 21 U/L (ref 10–40)
BILIRUB SERPL-MCNC: 0.8 MG/DL (ref 0.2–1.2)
BUN SERPL-MCNC: 16 MG/DL (ref 7–25)
BUN/CREAT SERPL: ABNORMAL (CALC) (ref 6–22)
CALCIUM SERPL-MCNC: 9.3 MG/DL (ref 8.6–10.3)
CHLORIDE SERPL-SCNC: 105 MMOL/L (ref 98–110)
CHOLEST SERPL-MCNC: 227 MG/DL
CHOLEST/HDLC SERPL: 4.9 (CALC)
CO2 SERPL-SCNC: 28 MMOL/L (ref 20–32)
CREAT SERPL-MCNC: 0.81 MG/DL (ref 0.6–1.35)
GLOBULIN SER CALC-MCNC: 2.6 G/DL (CALC) (ref 1.9–3.7)
GLUCOSE SERPL-MCNC: 100 MG/DL (ref 65–99)
HDLC SERPL-MCNC: 46 MG/DL
LDLC SERPL CALC-MCNC: 156 MG/DL (CALC)
NONHDLC SERPL-MCNC: 181 MG/DL (CALC)
POTASSIUM SERPL-SCNC: 4.5 MMOL/L (ref 3.5–5.3)
PROT SERPL-MCNC: 7 G/DL (ref 6.1–8.1)
SL AMB EGFR AFRICAN AMERICAN: 124 ML/MIN/1.73M2
SL AMB EGFR NON AFRICAN AMERICAN: 107 ML/MIN/1.73M2
SODIUM SERPL-SCNC: 138 MMOL/L (ref 135–146)
TRIGL SERPL-MCNC: 127 MG/DL
TSH SERPL-ACNC: 1.13 MIU/L (ref 0.4–4.5)

## 2022-05-17 ENCOUNTER — OFFICE VISIT (OUTPATIENT)
Dept: URGENT CARE | Facility: CLINIC | Age: 47
End: 2022-05-17
Payer: COMMERCIAL

## 2022-05-17 VITALS
SYSTOLIC BLOOD PRESSURE: 125 MMHG | HEART RATE: 67 BPM | DIASTOLIC BLOOD PRESSURE: 77 MMHG | TEMPERATURE: 97.8 F | WEIGHT: 231.4 LBS | HEIGHT: 64 IN | OXYGEN SATURATION: 98 % | RESPIRATION RATE: 16 BRPM | BODY MASS INDEX: 39.5 KG/M2

## 2022-05-17 DIAGNOSIS — J20.9 ACUTE BRONCHITIS, UNSPECIFIED ORGANISM: Primary | ICD-10-CM

## 2022-05-17 PROCEDURE — G0382 LEV 3 HOSP TYPE B ED VISIT: HCPCS | Performed by: NURSE PRACTITIONER

## 2022-05-17 RX ORDER — BENZONATATE 100 MG/1
100 CAPSULE ORAL 3 TIMES DAILY PRN
Qty: 20 CAPSULE | Refills: 0 | Status: SHIPPED | OUTPATIENT
Start: 2022-05-17

## 2022-05-17 RX ORDER — AZITHROMYCIN 250 MG/1
TABLET, FILM COATED ORAL
Qty: 6 TABLET | Refills: 0 | Status: SHIPPED | OUTPATIENT
Start: 2022-05-17 | End: 2022-05-21

## 2022-05-17 NOTE — PATIENT INSTRUCTIONS
Take medication as directed  Rest and drink plenty of fluids  A cool mist humidifier can be helpful  If you develop a worsening cough, chest pain, shortness of breath, palpitations, coughing up blood, prolonged high fever, any new or concerning symptoms please return or proceed ER    Recommend following up with PCP in 3-5 days

## 2022-05-17 NOTE — PROGRESS NOTES
[address]        NAME: Manuela Henning is a 55 y o  male  : 1975    MRN: [id number]  DATE: May 17, 2022  TIME: 10:13 AM    Assessment and Plan   Acute bronchitis, unspecified organism [J20 9]  1  Acute bronchitis, unspecified organism  azithromycin (ZITHROMAX) 250 mg tablet    benzonatate (TESSALON PERLES) 100 mg capsule         Patient Instructions     Patient Instructions   Take medication as directed  Rest and drink plenty of fluids  A cool mist humidifier can be helpful  If you develop a worsening cough, chest pain, shortness of breath, palpitations, coughing up blood, prolonged high fever, any new or concerning symptoms please return or proceed ER  Recommend following up with PCP in 3-5 days                  Follow up with PCP in 3-5 days  Proceed to  ER if symptoms worsen  Chief Complaint     Chief Complaint   Patient presents with    Cough     Cough, congestion started Last Tuesday  History of Present Illness       Cough  This is a new problem  Episode onset: 1 week  The problem has been gradually worsening  The problem occurs every few minutes  The cough is productive of sputum  Associated symptoms include myalgias, nasal congestion and rhinorrhea  Pertinent negatives include no chest pain, chills, ear pain, fever, headaches, postnasal drip, rash, sore throat, shortness of breath, sweats or wheezing  Nothing aggravates the symptoms  Risk factors: denies any recent sick contacts or known exposure to covid 19  Treatments tried: dayquil  The treatment provided mild relief  His past medical history is significant for environmental allergies  There is no history of asthma, COPD or pneumonia  Review of Systems   Review of Systems   Constitutional: Negative for chills, diaphoresis, fatigue and fever  HENT: Positive for congestion and rhinorrhea   Negative for ear pain, facial swelling, mouth sores, postnasal drip, sinus pressure, sinus pain, sore throat and trouble swallowing  Eyes: Negative for photophobia and visual disturbance  Respiratory: Positive for cough  Negative for chest tightness, shortness of breath, wheezing and stridor  Cardiovascular: Negative for chest pain and palpitations  Gastrointestinal: Negative for abdominal pain, diarrhea, nausea and vomiting  Genitourinary: Negative  Musculoskeletal: Positive for myalgias  Negative for arthralgias, back pain, joint swelling, neck pain and neck stiffness  Skin: Negative for rash  Allergic/Immunologic: Positive for environmental allergies  Neurological: Negative for dizziness, facial asymmetry, weakness, light-headedness, numbness and headaches  Current Medications       Current Outpatient Medications:     aspirin (ECOTRIN) 325 mg EC tablet, Take 1 tablet by mouth daily, Disp: , Rfl:     azithromycin (ZITHROMAX) 250 mg tablet, Take 2 tablets today then 1 tablet daily x 4 days, Disp: 6 tablet, Rfl: 0    benzonatate (TESSALON PERLES) 100 mg capsule, Take 1 capsule (100 mg total) by mouth as needed in the morning and 1 capsule (100 mg total) as needed at noon and 1 capsule (100 mg total) as needed in the evening for cough  , Disp: 20 capsule, Rfl: 0    loratadine (CLARITIN) 10 mg tablet, Take 10 mg by mouth daily, Disp: , Rfl:     Multiple Vitamin (MULTI-VITAMIN DAILY PO), Take 1 tablet by mouth daily, Disp: , Rfl:     calcium carbonate (TUMS) 500 mg chewable tablet, Chew 1 tablet daily as needed for indigestion or heartburn (Patient not taking: Reported on 5/17/2022), Disp: , Rfl:     Current Allergies     Allergies as of 05/17/2022    (No Known Allergies)            The following portions of the patient's history were reviewed and updated as appropriate: allergies, current medications, past family history, past medical history, past social history, past surgical history and problem list      Past Medical History:   Diagnosis Date    Anxiety     Depression     Elevated cholesterol  History of TIA (transient ischemic attack)     Irregular heart beat     Palpitations     RESOLVED: [id number]    Taste impairment     RESOLVED: [id number]    TIA (transient ischemic attack)     TIA (transient ischemic attack)     Transient vision disturbance, left     RESOLVED: [id number]       History reviewed  No pertinent surgical history  Family History   Problem Relation Age of Onset    Coronary artery disease Mother     Other Mother         STENT    Heart disease Father     Other Father         STENT    Cancer Maternal Grandmother     Cancer Paternal Grandmother     Stroke Paternal Grandfather     Heart failure Paternal Uncle     Heart failure Maternal Aunt          Medications have been verified  Objective   /77   Pulse 67   Temp 97 8 °F (36 6 °C)   Resp 16   Ht 5' 4" (1 626 m)   Wt 105 kg (231 lb 6 4 oz)   SpO2 98%   BMI 39 72 kg/m²   No LMP for male patient  Physical Exam     Physical Exam  Constitutional:       General: He is not in acute distress  Appearance: He is well-developed  HENT:      Head: Normocephalic and atraumatic  Right Ear: Hearing, tympanic membrane, ear canal and external ear normal       Left Ear: Hearing, tympanic membrane, ear canal and external ear normal       Nose: Congestion and rhinorrhea present  No mucosal edema  Right Sinus: No maxillary sinus tenderness or frontal sinus tenderness  Left Sinus: No maxillary sinus tenderness or frontal sinus tenderness  Mouth/Throat:      Mouth: Mucous membranes are moist       Pharynx: Oropharynx is clear  Uvula midline  No oropharyngeal exudate or posterior oropharyngeal erythema  Tonsils: No tonsillar exudate or tonsillar abscesses  1+ on the right  1+ on the left  Cardiovascular:      Rate and Rhythm: Normal rate and regular rhythm        Heart sounds: Normal heart sounds, S1 normal and S2 normal    Pulmonary:      Effort: Pulmonary effort is normal       Breath sounds: Normal air entry  Examination of the right-lower field reveals rhonchi  Examination of the left-lower field reveals rhonchi  Rhonchi (mild) present  Lymphadenopathy:      Cervical: No cervical adenopathy  Skin:     General: Skin is warm and dry  Capillary Refill: Capillary refill takes less than 2 seconds  Neurological:      Mental Status: He is alert and oriented to person, place, and time

## 2022-05-18 ENCOUNTER — TELEPHONE (OUTPATIENT)
Dept: FAMILY MEDICINE CLINIC | Facility: CLINIC | Age: 47
End: 2022-05-18

## 2022-05-18 DIAGNOSIS — Z20.828 EXPOSURE TO THE FLU: Primary | ICD-10-CM

## 2022-05-18 RX ORDER — OSELTAMIVIR PHOSPHATE 75 MG/1
75 CAPSULE ORAL DAILY
Qty: 10 CAPSULE | Refills: 0 | Status: SHIPPED | OUTPATIENT
Start: 2022-05-18 | End: 2022-05-28

## 2022-05-18 NOTE — TELEPHONE ENCOUNTER
Spoke with pt's wife earlier today as his dtr has flu and covid  Pt is currently being treated for bronchitis with antibiotic - started at urgent care yesterday  Will begin tamiflu for flu prophylaxis -   Discussed possible side effects of tamilfu  And asked for a call back with any questions/concerns or if symptoms not improving/worsening

## 2022-09-16 ENCOUNTER — CLINICAL SUPPORT (OUTPATIENT)
Dept: FAMILY MEDICINE CLINIC | Facility: CLINIC | Age: 47
End: 2022-09-16
Payer: COMMERCIAL

## 2022-09-16 DIAGNOSIS — Z11.9 ENCOUNTER FOR SCREENING FOR INFECTIOUS AND PARASITIC DISEASES, UNSPECIFIED: Primary | ICD-10-CM

## 2022-09-16 LAB
SARS-COV-2 AG UPPER RESP QL IA: NEGATIVE
VALID CONTROL: NORMAL

## 2022-09-16 PROCEDURE — 87811 SARS-COV-2 COVID19 W/OPTIC: CPT

## 2023-09-28 ENCOUNTER — HOSPITAL ENCOUNTER (OUTPATIENT)
Dept: MRI IMAGING | Facility: HOSPITAL | Age: 48
Discharge: HOME | End: 2023-09-28
Attending: FAMILY MEDICINE
Payer: COMMERCIAL

## 2023-09-28 DIAGNOSIS — M25.562 PAIN IN LEFT KNEE: ICD-10-CM

## 2023-09-28 PROCEDURE — 73721 MRI JNT OF LWR EXTRE W/O DYE: CPT

## 2023-09-28 PROCEDURE — G1004 CDSM NDSC: HCPCS

## 2025-05-02 ENCOUNTER — OFFICE VISIT (OUTPATIENT)
Dept: FAMILY MEDICINE CLINIC | Facility: CLINIC | Age: 50
End: 2025-05-02
Payer: COMMERCIAL

## 2025-05-02 VITALS
WEIGHT: 256.4 LBS | RESPIRATION RATE: 18 BRPM | HEART RATE: 84 BPM | BODY MASS INDEX: 43.77 KG/M2 | HEIGHT: 64 IN | OXYGEN SATURATION: 96 % | SYSTOLIC BLOOD PRESSURE: 126 MMHG | TEMPERATURE: 97.7 F | DIASTOLIC BLOOD PRESSURE: 94 MMHG

## 2025-05-02 DIAGNOSIS — Z76.89 ENCOUNTER TO ESTABLISH CARE WITH NEW DOCTOR: ICD-10-CM

## 2025-05-02 DIAGNOSIS — Z12.11 SCREENING FOR MALIGNANT NEOPLASM OF COLON: ICD-10-CM

## 2025-05-02 DIAGNOSIS — E66.813 OBESITY, CLASS III, BMI 40-49.9 (MORBID OBESITY): ICD-10-CM

## 2025-05-02 DIAGNOSIS — Q21.12 PFO (PATENT FORAMEN OVALE): ICD-10-CM

## 2025-05-02 DIAGNOSIS — Z13.220 SCREENING FOR HYPERLIPIDEMIA: ICD-10-CM

## 2025-05-02 DIAGNOSIS — G47.33 OBSTRUCTIVE SLEEP APNEA ON CPAP: Primary | ICD-10-CM

## 2025-05-02 DIAGNOSIS — Z12.5 PROSTATE CANCER SCREENING: ICD-10-CM

## 2025-05-02 DIAGNOSIS — E78.5 DYSLIPIDEMIA: ICD-10-CM

## 2025-05-02 PROCEDURE — 99203 OFFICE O/P NEW LOW 30 MIN: CPT | Performed by: FAMILY MEDICINE

## 2025-05-02 RX ORDER — NAPROXEN 500 MG/1
TABLET ORAL
COMMUNITY
End: 2025-05-02

## 2025-05-02 NOTE — PROGRESS NOTES
Name: Shady Bethea      : 1975      MRN: [id number]  Encounter Provider: Neftaly Arvizu MD  Encounter Date: 2025   Encounter department: [facility] AT [place]    :  Assessment & Plan  Obstructive sleep apnea on CPAP  On cpap       Orders:    Ambulatory Referral to Sleep Medicine; Future    Dyslipidemia         Obesity, Class III, BMI 40-49.9 (morbid obesity)           Prostate cancer screening    Orders:    PSA, Total Screen; Future    Screening for malignant neoplasm of colon    Orders:    Ambulatory referral to Gastroenterology; Future    PFO (patent foramen ovale)         Screening for hyperlipidemia    Orders:    Lipid panel; Future    Encounter to establish care with new doctor           Assessment & Plan  1. Elevated cholesterol.  - Reported elevated cholesterol levels from a recent non-fasting lab test.  - A repeat lipid panel will be ordered to confirm the diagnosis.  - Advised to fast for at least 8 hours before the test.  - Will review lab results during the follow-up visit.    2. Sleep apnea.  - Using a CPAP machine since  but has not had a recent sleep study.  - Referral to a sleep medicine specialist will be made for further evaluation and potential adjustment of CPAP settings.  - Advised to continue using the CPAP machine and avoid taking breaks from it, even when experiencing minor issues like nasal sores.  - Discussed the importance of proper CPAP pressure settings and potential risks of incorrect adjustments.    3. Health maintenance.  - Has never had colon cancer screening.  - A Cologuard test will be ordered for colon cancer screening.  - A PSA test will be ordered for prostate cancer screening.  - Advised to send recent lab results from health insurance via [employer] [facility] for review.  - If the Cologuard test is positive, a colonoscopy will be recommended.    Follow-up  - Follow-up in 4 weeks for an annual physical examination and review of lab results.        Depression  "Screening and Follow-up Plan: Patient was screened for depression during today's encounter. They screened negative with a PHQ-2 score of 1.          History of Present Illness     History of Present Illness  The patient presents to establish care.    The chief complaint is elevated cholesterol levels. He recently underwent fasting laboratory tests but did not fast , including blood and urine analysis, as part of a life insurance application. The results indicated elevated cholesterol levels, although specific values are not recalled. Food was consumed approximately 2 hours prior to the test. A similar incident occurred 5 to 10 years ago when cholesterol levels were high due to non-fasting but normalized upon retesting after fasting. There is no history of chronic medical conditions or medication use.    A known diagnosis of sleep apnea is reported, for which a CPAP machine is used. A new sleep study may be necessary as the last one was conducted in 2015. A newer CPAP machine, inherited from his mother, is found to be more effective than the previous one. Difficulty using the machine occurs when experiencing a cold, and a recent nasal sore from the nasal pillows necessitated a break from the CPAP machine. Fatigue persists, and snoring occurs even with the CPAP machine on. The initial diagnosis of sleep apnea was made after his wife observed periods of apnea during sleep.    There is no history of colon cancer screening and no known family history of the disease.    SOCIAL HISTORY  He owns a [employer] company.    FAMILY HISTORY  He reports no family history of colon cancer that he is aware of.     Review of Systems   All other systems reviewed and are negative.    Objective   /94 (BP Location: Left arm, Patient Position: Sitting, Cuff Size: Large)   Pulse 84   Temp 97.7 °F (36.5 °C) (Tympanic)   Resp 18   Ht 5' 4\" (1.626 m)   Wt 116 kg (256 lb 6.4 oz)   SpO2 96%   BMI 44.01 kg/m²     Physical " Exam    Physical Exam  Vitals and nursing note reviewed.   Constitutional:       General: He is not in acute distress.     Appearance: Normal appearance. He is well-developed. He is not ill-appearing, toxic-appearing or diaphoretic.   HENT:      Head: Normocephalic and atraumatic.   Eyes:      General:         Right eye: No discharge.         Left eye: No discharge.      Extraocular Movements: Extraocular movements intact.      Conjunctiva/sclera: Conjunctivae normal.   Cardiovascular:      Rate and Rhythm: Normal rate.      Pulses:           Dorsalis pedis pulses are 2+ on the right side and 2+ on the left side.        Posterior tibial pulses are 2+ on the right side and 2+ on the left side.   Pulmonary:      Effort: Pulmonary effort is normal.   Musculoskeletal:      Cervical back: Normal range of motion and neck supple.   Feet:      Right foot:      Skin integrity: No ulcer, skin breakdown, erythema, warmth, callus or dry skin.      Left foot:      Skin integrity: No ulcer, skin breakdown, erythema, warmth, callus or dry skin.   Skin:     General: Skin is dry.      Capillary Refill: Capillary refill takes less than 2 seconds.   Neurological:      Mental Status: He is alert and oriented to person, place, and time.   Psychiatric:         Mood and Affect: Mood normal.         Behavior: Behavior normal.         Thought Content: Thought content normal.         Judgment: Judgment normal.

## 2025-05-05 ENCOUNTER — PATIENT MESSAGE (OUTPATIENT)
Dept: FAMILY MEDICINE CLINIC | Facility: CLINIC | Age: 50
End: 2025-05-05

## 2025-05-06 ENCOUNTER — OFFICE VISIT (OUTPATIENT)
Dept: SLEEP CENTER | Facility: CLINIC | Age: 50
End: 2025-05-06
Payer: COMMERCIAL

## 2025-05-06 VITALS
HEIGHT: 64 IN | SYSTOLIC BLOOD PRESSURE: 125 MMHG | BODY MASS INDEX: 44.05 KG/M2 | DIASTOLIC BLOOD PRESSURE: 82 MMHG | OXYGEN SATURATION: 98 % | HEART RATE: 79 BPM | WEIGHT: 258 LBS

## 2025-05-06 DIAGNOSIS — E61.1 IRON DEFICIENCY: ICD-10-CM

## 2025-05-06 DIAGNOSIS — G47.19 EXCESSIVE DAYTIME SLEEPINESS: ICD-10-CM

## 2025-05-06 DIAGNOSIS — E66.813 CLASS 3 OBESITY: ICD-10-CM

## 2025-05-06 DIAGNOSIS — G25.81 RLS (RESTLESS LEGS SYNDROME): ICD-10-CM

## 2025-05-06 DIAGNOSIS — G47.33 OBSTRUCTIVE SLEEP APNEA ON CPAP: Primary | ICD-10-CM

## 2025-05-06 PROCEDURE — 99204 OFFICE O/P NEW MOD 45 MIN: CPT
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
## 2025-05-06 NOTE — PATIENT INSTRUCTIONS
- Home sleep study  - Please bring your current CPAP into the office to have the data downloaded  - Please have your iron labs completed.  You should not take any iron supplements for the 2 days before having these labs done, try and have the labs drawn in the morning while fasting.

## 2025-05-07 ENCOUNTER — APPOINTMENT (OUTPATIENT)
Dept: LAB | Facility: CLINIC | Age: 50
End: 2025-05-07
Payer: COMMERCIAL

## 2025-05-07 DIAGNOSIS — Z12.5 PROSTATE CANCER SCREENING: ICD-10-CM

## 2025-05-07 DIAGNOSIS — E61.1 IRON DEFICIENCY: ICD-10-CM

## 2025-05-07 DIAGNOSIS — Z13.220 SCREENING FOR HYPERLIPIDEMIA: ICD-10-CM

## 2025-05-07 LAB
CHOLEST SERPL-MCNC: 237 MG/DL (ref ?–200)
FERRITIN SERPL-MCNC: 71 NG/ML (ref 30–336)
HDLC SERPL-MCNC: 49 MG/DL
IRON SATN MFR SERPL: 23 % (ref 15–50)
IRON SERPL-MCNC: 94 UG/DL (ref 50–212)
LDLC SERPL CALC-MCNC: 157 MG/DL (ref 0–100)
NONHDLC SERPL-MCNC: 188 MG/DL
PSA SERPL-MCNC: 0.23 NG/ML (ref 0–4)
TIBC SERPL-MCNC: 404.6 UG/DL (ref 250–450)
TRANSFERRIN SERPL-MCNC: 289 MG/DL (ref 203–362)
TRIGL SERPL-MCNC: 153 MG/DL (ref ?–150)
UIBC SERPL-MCNC: 311 UG/DL (ref 155–355)

## 2025-05-07 PROCEDURE — G0103 PSA SCREENING: HCPCS

## 2025-05-07 PROCEDURE — 83550 IRON BINDING TEST: CPT

## 2025-05-07 PROCEDURE — 36415 COLL VENOUS BLD VENIPUNCTURE: CPT

## 2025-05-07 PROCEDURE — 82728 ASSAY OF FERRITIN: CPT

## 2025-05-07 PROCEDURE — 80061 LIPID PANEL: CPT

## 2025-05-07 PROCEDURE — 83540 ASSAY OF IRON: CPT

## 2025-05-07 NOTE — TELEPHONE ENCOUNTER
Please call patient let him know that PSA is for prostate cancer screening.  I had ordered that for him at the visit however it looks like that lab was done with his insurance stuff so he does not need to repeat it.  Please also give him guidance on colonoscopy.  Give him a number to call and schedule

## 2025-05-08 NOTE — PATIENT COMMUNICATION
Called and spoke to patient. Patient understood and had no further questions. He will follow up with gastro regarding the colonoscopy.

## 2025-05-09 ENCOUNTER — RESULTS FOLLOW-UP (OUTPATIENT)
Dept: SLEEP CENTER | Facility: CLINIC | Age: 50
End: 2025-05-09

## 2025-05-09 DIAGNOSIS — E61.1 IRON DEFICIENCY: Primary | ICD-10-CM

## 2025-05-10 ENCOUNTER — RESULTS FOLLOW-UP (OUTPATIENT)
Dept: FAMILY MEDICINE CLINIC | Facility: CLINIC | Age: 50
End: 2025-05-10

## 2025-05-12 ENCOUNTER — TELEPHONE (OUTPATIENT)
Age: 50
End: 2025-05-12

## 2025-05-12 ENCOUNTER — PREP FOR PROCEDURE (OUTPATIENT)
Age: 50
End: 2025-05-12

## 2025-05-12 DIAGNOSIS — Z12.11 SCREENING FOR COLON CANCER: Primary | ICD-10-CM

## 2025-05-12 NOTE — TELEPHONE ENCOUNTER
05/12/25  Screened by: Carlos Chester    Referring Provider     Pre- Screening:     There is no height or weight on file to calculate BMI.  Has patient been referred for a routine screening Colonoscopy? yes  Is the patient between 45-75 years old? yes      Previous Colonoscopy no    Does the patient want to see a Gastroenterologist prior to their procedure OR are they having any GI symptoms? no    Has the patient been hospitalized or had abdominal surgery in the past 6 months? no    Does the patient use supplemental oxygen? no    Does the patient take Coumadin, Lovenox, Plavix, Elliquis, Xarelto, or other blood thinning medication? no    Has the patient had a stroke, cardiac event, or stent placed in the past year? no    If patient is between 45yrs - 49yrs, please advise patient that we will have to confirm benefits & coverage with their insurance company for a routine screening colonoscopy.

## 2025-05-12 NOTE — TELEPHONE ENCOUNTER
Scheduled date of colonoscopy (as of today):05.29.25    Physician performing colonoscopy:Anthony    Location of colonoscopy:[facility]    Bowel prep reviewed with patient:Guero/Pattie    Instructions reviewed with patient by:Lsims and sent to chart

## 2025-05-12 NOTE — LETTER
Attached are your prep instructions for your upcoming procedure on 05.29.25. If you have any questions or concerns please contact us at [phone].    Thank you,      [facility]

## 2025-05-13 NOTE — TELEPHONE ENCOUNTER
rescheduled date of colonoscopy (as of today):6/2/2025  Physician performing colonoscopy:Dr Mathis  Location of colonoscopy:[place]  Bowel prep reviewed with patient:Miralax/Dulcolax  Instructions reviewed with patient by:sent via letter  Clearances: N/A

## 2025-05-20 ENCOUNTER — TELEPHONE (OUTPATIENT)
Age: 50
End: 2025-05-20

## 2025-05-20 NOTE — TELEPHONE ENCOUNTER
Patient called in he will be coming to [place] office today was advised by Dr. Travis to bring in current CPAP machine to have data updated

## 2025-05-21 ENCOUNTER — TELEPHONE (OUTPATIENT)
Dept: SLEEP CENTER | Facility: CLINIC | Age: 50
End: 2025-05-21

## 2025-05-21 NOTE — TELEPHONE ENCOUNTER
Pt came in today looking to change pressure on machine , pt isnt aware what was his previous setting to his machine being as though It was his mom old machine , [facility] health left the settings on what it was previously 7-11 and would like a script for pts pressure pt does get supplies from [facility] but is willing to change after he receives new cpap

## 2025-05-22 DIAGNOSIS — G47.33 OSA (OBSTRUCTIVE SLEEP APNEA): Primary | ICD-10-CM

## 2025-05-28 ENCOUNTER — TELEPHONE (OUTPATIENT)
Dept: SLEEP CENTER | Facility: CLINIC | Age: 50
End: 2025-05-28

## 2025-05-29 LAB
DME PARACHUTE DELIVERY DATE REQUESTED: NORMAL
DME PARACHUTE ITEM DESCRIPTION: NORMAL
DME PARACHUTE ORDER STATUS: NORMAL
DME PARACHUTE SUPPLIER NAME: NORMAL
DME PARACHUTE SUPPLIER PHONE: NORMAL

## 2025-06-02 ENCOUNTER — ANESTHESIA EVENT (OUTPATIENT)
Dept: GASTROENTEROLOGY | Facility: HOSPITAL | Age: 50
End: 2025-06-02
Payer: COMMERCIAL

## 2025-06-02 ENCOUNTER — HOSPITAL ENCOUNTER (OUTPATIENT)
Dept: GASTROENTEROLOGY | Facility: HOSPITAL | Age: 50
Setting detail: OUTPATIENT SURGERY
Discharge: HOME | End: 2025-06-02
Attending: STUDENT IN AN ORGANIZED HEALTH CARE EDUCATION/TRAINING PROGRAM
Payer: COMMERCIAL

## 2025-06-02 ENCOUNTER — ANESTHESIA (OUTPATIENT)
Dept: GASTROENTEROLOGY | Facility: HOSPITAL | Age: 50
End: 2025-06-02
Payer: COMMERCIAL

## 2025-06-02 VITALS
RESPIRATION RATE: 19 BRPM | DIASTOLIC BLOOD PRESSURE: 91 MMHG | OXYGEN SATURATION: 96 % | HEART RATE: 77 BPM | SYSTOLIC BLOOD PRESSURE: 128 MMHG | TEMPERATURE: 98 F

## 2025-06-02 DIAGNOSIS — Z12.11 SCREENING FOR COLON CANCER: ICD-10-CM

## 2025-06-02 PROCEDURE — 88305 TISSUE EXAM BY PATHOLOGIST: CPT | Performed by: STUDENT IN AN ORGANIZED HEALTH CARE EDUCATION/TRAINING PROGRAM

## 2025-06-02 PROCEDURE — 45385 COLONOSCOPY W/LESION REMOVAL: CPT | Performed by: STUDENT IN AN ORGANIZED HEALTH CARE EDUCATION/TRAINING PROGRAM

## 2025-06-02 PROCEDURE — 45380 COLONOSCOPY AND BIOPSY: CPT | Performed by: STUDENT IN AN ORGANIZED HEALTH CARE EDUCATION/TRAINING PROGRAM

## 2025-06-02 RX ORDER — SODIUM CHLORIDE, SODIUM LACTATE, POTASSIUM CHLORIDE, CALCIUM CHLORIDE 600; 310; 30; 20 MG/100ML; MG/100ML; MG/100ML; MG/100ML
125 INJECTION, SOLUTION INTRAVENOUS CONTINUOUS
Status: DISCONTINUED | OUTPATIENT
Start: 2025-06-02 | End: 2025-06-06 | Stop reason: HOSPADM

## 2025-06-02 RX ORDER — PROPOFOL 10 MG/ML
INJECTION, EMULSION INTRAVENOUS CONTINUOUS PRN
Status: DISCONTINUED | OUTPATIENT
Start: 2025-06-02 | End: 2025-06-02

## 2025-06-02 RX ORDER — PROPOFOL 10 MG/ML
INJECTION, EMULSION INTRAVENOUS AS NEEDED
Status: DISCONTINUED | OUTPATIENT
Start: 2025-06-02 | End: 2025-06-02

## 2025-06-02 RX ADMIN — PROPOFOL 100 MG: 10 INJECTION, EMULSION INTRAVENOUS at 10:58

## 2025-06-02 RX ADMIN — PROPOFOL 150 MG: 10 INJECTION, EMULSION INTRAVENOUS at 10:49

## 2025-06-02 RX ADMIN — PROPOFOL 120 MCG/KG/MIN: 10 INJECTION, EMULSION INTRAVENOUS at 10:50

## 2025-06-02 RX ADMIN — SODIUM CHLORIDE, SODIUM LACTATE, POTASSIUM CHLORIDE, AND CALCIUM CHLORIDE: .6; .31; .03; .02 INJECTION, SOLUTION INTRAVENOUS at 10:20

## 2025-06-02 NOTE — H&P
[facility]  History & Physical     PATIENT INFO     Name: Shady Bethea  YOB: 1975   Age: 50 y.o.   Sex: male   MRN: [id number]     HISTORY OF PRESENT ILLNESS     Shady Bethea is a 50 y.o. year old male who presents for colonoscopy.  No prior colonoscopy.  No antithrombotics or anticoagulants.     REVIEW OF SYSTEMS     Per the HPI, and otherwise unremarkable.    Historical Information   Past Medical History[1]  Past Surgical History[2]  Social History   Social History     Substance and Sexual Activity   Alcohol Use Never     Social History     Substance and Sexual Activity   Drug Use No     Tobacco Use History[3]  Family History[4]     MEDICATIONS & ALLERGIES     Current Outpatient Medications   Medication Instructions    aspirin (ECOTRIN) 325 mg EC tablet 1 tablet, Daily    loratadine (CLARITIN) 10 mg, Daily    Multiple Vitamin (MULTI-VITAMIN DAILY PO) 1 tablet, Daily     Allergies[5]     PHYSICAL EXAM      Objective   Blood pressure 130/80, pulse 74, temperature (!) 97 °F (36.1 °C), temperature source Temporal, resp. rate 18, SpO2 96%. There is no height or weight on file to calculate BMI.    General Appearance:   Alert, cooperative, no distress   Lungs:   Equal chest rise, respirations unlabored    Heart:   Regular rate and rhythm   Abdomen:   Soft, non-tender, non-distended; normal bowel sounds; no masses, no organomegaly    Extremities:   No edema       ASSESSMENT & PLAN     This is a 50 y.o. year old male here for colonoscopy, and he is stable and optimized for his procedure.      Juanito Mathis D.O.  [facility]  Division of Gastroenterology & Hepatology  Available on TigerText  [email or web address]    ** Please Note: This note is constructed using a voice recognition dictation system. **         [1]   Past Medical History:  Diagnosis Date    Anxiety     Depression     Elevated cholesterol     History of TIA (transient ischemic attack)      Irregular heart beat     Palpitations     RESOLVED: 27APR2017    Taste impairment     RESOLVED: 27APR2017    TIA (transient ischemic attack)     TIA (transient ischemic attack)     Transient vision disturbance, left     RESOLVED: 27APR2017   [2]   Past Surgical History:  Procedure Laterality Date    CARDIAC LOOP RECORDER N/A    [3]   Social History  Tobacco Use   Smoking Status Never   Smokeless Tobacco Never   [4]   Family History  Problem Relation Name Age of Onset    Coronary artery disease Mother      Other Mother          STENT    Heart disease Father      Other Father          STENT    Cancer Maternal Grandmother      Cancer Paternal Grandmother      Stroke Paternal Grandfather      Heart failure Paternal Uncle      Heart failure Maternal Aunt     [5] No Known Allergies

## 2025-06-02 NOTE — ANESTHESIA POSTPROCEDURE EVALUATION
Post-Op Assessment Note    CV Status:  Stable  Pain Score: 0    Pain management: adequate       Mental Status:  Alert and awake   Hydration Status:  Euvolemic   PONV Controlled:  Controlled   Airway Patency:  Patent     Post Op Vitals Reviewed: Yes    No anethesia notable event occurred.    Staff: CRNA           Last Filed PACU Vitals:  Vitals Value Taken Time   Temp     Pulse 87    /77    Resp 24    SpO2 96

## 2025-06-02 NOTE — ANESTHESIA PREPROCEDURE EVALUATION
Procedure:  COLONOSCOPY    Relevant Problems   ANESTHESIA (within normal limits)      GI/HEPATIC   (+) Gastroesophageal reflux disease without esophagitis      NEURO/PSYCH   (+) Anxiety   (+) Headache   (+) TIA (transient ischemic attack)      PULMONARY   (+) Obstructive sleep apnea on CPAP      Other Pediatrics   (+) PFO (patent foramen ovale)      Other   (+) Dyslipidemia   (+) Obesity, Class III, BMI 40-49.9 (morbid obesity)        Physical Exam    Airway     Mallampati score: III  TM Distance: >3 FB  Neck ROM: full      Cardiovascular      Dental   Comment: Denies loose teeth     Pulmonary      Neurological    He appears awake, alert and oriented x3.      Other Findings  post-pubertal.      Anesthesia Plan  ASA Score- 3     Anesthesia Type- IV sedation with anesthesia with ASA Monitors.         Additional Monitors:     Airway Plan: natural airway.           Plan Factors-Exercise tolerance (METS): >4 METS.    Chart reviewed. EKG reviewed.  Existing labs reviewed. Patient summary reviewed.    Patient is not a current smoker.              Induction- intravenous.    Postoperative Plan- .   Monitoring Plan - Monitoring plan - standard ASA monitoring          Informed Consent- Anesthetic plan and risks discussed with patient.  I personally reviewed this patient with the CRNA. Discussed and agreed on the Anesthesia Plan with the CRNA..      NPO Status:  Vitals Value Taken Time   Date of last liquid 06/02/25 06/02/25 09:54   Time of last liquid 0530 06/02/25 09:54   Date of last solid 05/31/25 06/02/25 09:54   Time of last solid 1800 06/02/25 09:54

## 2025-06-03 ENCOUNTER — TELEPHONE (OUTPATIENT)
Age: 50
End: 2025-06-03

## 2025-06-03 NOTE — TELEPHONE ENCOUNTER
Patient contacted the office this morning in regards to congestion/mucus which started yesterday upon waking up from colonoscopy procedure yesterday 06/02/25. States that they did say congestion was common to occur after but it has lingered on into today and unsure if that is normal. Having a lot of mucus/stuffiness/congestion. Asking what pcp would recommend he do at this time if anything or wait to see if he improves. Please contact patient back with an update, thank you.

## 2025-06-05 ENCOUNTER — RESULTS FOLLOW-UP (OUTPATIENT)
Age: 50
End: 2025-06-05

## 2025-06-05 PROCEDURE — 88305 TISSUE EXAM BY PATHOLOGIST: CPT | Performed by: STUDENT IN AN ORGANIZED HEALTH CARE EDUCATION/TRAINING PROGRAM

## 2025-06-08 ENCOUNTER — HOSPITAL ENCOUNTER (OUTPATIENT)
Dept: SLEEP CENTER | Facility: CLINIC | Age: 50
Discharge: HOME | End: 2025-06-08
Payer: COMMERCIAL

## 2025-06-08 DIAGNOSIS — G47.19 EXCESSIVE DAYTIME SLEEPINESS: ICD-10-CM

## 2025-06-08 DIAGNOSIS — G47.33 OBSTRUCTIVE SLEEP APNEA ON CPAP: ICD-10-CM

## 2025-06-08 PROCEDURE — G0399 HOME SLEEP TEST/TYPE 3 PORTA: HCPCS

## 2025-06-09 NOTE — PROGRESS NOTES
Home Sleep Study Documentation    HOME STUDY DEVICE: Noxturnal no                                           Sarika G3 yes      Pre-Sleep Home Study:    Set-up and instructions performed by: Jonathon Hoffman    Technician performed demonstration for Patient: yes    Return demonstration performed by Patient: yes    Written instructions provided to Patient: yes    Patient signed consent form: yes          Post-Sleep Home Study:    Post Test Questionnaire Data: On the post-study questionnaire, the patient estimated 6 hours of sleep during this test, with 3-4 awakenings. Please refer to scanned form for additional comments on alcohol use on day of test, medications, and/or activities during awakenings    Additional comments by Patient:       Home Sleep Study Failed:no:    Failure reason: N/A    Reported or Detected: N/A    Scored by: JESS Almeida

## 2025-06-11 ENCOUNTER — RESULTS FOLLOW-UP (OUTPATIENT)
Dept: SLEEP CENTER | Facility: CLINIC | Age: 50
End: 2025-06-11

## 2025-06-11 DIAGNOSIS — G47.33 OSA (OBSTRUCTIVE SLEEP APNEA): Primary | ICD-10-CM

## 2025-06-11 PROBLEM — G47.19 EXCESSIVE DAYTIME SLEEPINESS: Status: ACTIVE | Noted: 2025-06-11

## 2025-06-11 PROCEDURE — 95806 SLEEP STUDY UNATT&RESP EFFT: CPT

## 2025-06-12 ENCOUNTER — OFFICE VISIT (OUTPATIENT)
Dept: FAMILY MEDICINE CLINIC | Facility: CLINIC | Age: 50
End: 2025-06-12
Payer: COMMERCIAL

## 2025-06-12 VITALS
DIASTOLIC BLOOD PRESSURE: 94 MMHG | WEIGHT: 253.6 LBS | SYSTOLIC BLOOD PRESSURE: 118 MMHG | HEART RATE: 95 BPM | RESPIRATION RATE: 18 BRPM | TEMPERATURE: 98.3 F | HEIGHT: 64 IN | OXYGEN SATURATION: 97 % | BODY MASS INDEX: 43.29 KG/M2

## 2025-06-12 DIAGNOSIS — J06.9 VIRAL URI WITH COUGH: ICD-10-CM

## 2025-06-12 DIAGNOSIS — E78.5 DYSLIPIDEMIA: ICD-10-CM

## 2025-06-12 DIAGNOSIS — Z00.00 ANNUAL PHYSICAL EXAM: Primary | ICD-10-CM

## 2025-06-12 PROCEDURE — 99396 PREV VISIT EST AGE 40-64: CPT | Performed by: FAMILY MEDICINE

## 2025-06-12 PROCEDURE — 99213 OFFICE O/P EST LOW 20 MIN: CPT | Performed by: FAMILY MEDICINE

## 2025-06-12 RX ORDER — ROSUVASTATIN CALCIUM 5 MG/1
5 TABLET, COATED ORAL DAILY
Qty: 90 TABLET | Refills: 0 | Status: SHIPPED | OUTPATIENT
Start: 2025-06-12

## 2025-06-12 NOTE — ASSESSMENT & PLAN NOTE
Orders:    rosuvastatin (CRESTOR) 5 mg tablet; Take 1 tablet (5 mg total) by mouth daily    Lipid panel; Future

## 2025-06-12 NOTE — PROGRESS NOTES
"Adult Annual Physical  Name: Shady Bethea      : 1975      MRN: [id number]  Encounter Provider: Neftaly Arvizu MD  Encounter Date: 2025   Encounter department: [facility] [place]    :  Assessment & Plan  Dyslipidemia    Orders:    rosuvastatin (CRESTOR) 5 mg tablet; Take 1 tablet (5 mg total) by mouth daily    Lipid panel; Future    Annual physical exam             Preventive Screenings:  - Diabetes Screening: screening up-to-date  - Cholesterol Screening: screening up-to-date   - Hepatitis C screening: screening not indicated   - HIV screening: screening not indicated   - Colon cancer screening: screening up-to-date   - Lung cancer screening: screening not indicated   - Prostate cancer screening: screening up-to-date     Immunizations:  - Immunizations due: Prevnar 20 and Zoster (Shingrix)    Counseling/Anticipatory Guidance:    - Diet: discussed recommendations for a healthy/well-balanced diet.   - Exercise: the importance of regular exercise/physical activity was discussed. Recommend exercise 3-5 times per week for at least 30 minutes.          History of Present Illness     Adult Annual Physical:  Patient presents for annual physical.     Diet and Physical Activity:  - Diet/Nutrition: no special diet.  - Exercise: no formal exercise.    Depression Screening:  - PHQ-2 Score: 0    General Health:  - Sleep: sleeps well and 7-8 hours of sleep on average.  - Hearing: normal hearing bilateral ears.  - Vision: wears glasses and most recent eye exam > 1 year ago.  - Dental: no dental visits for > 1 year, brushes teeth once daily and does not floss.     Health:  - History of STDs: no.   - Urinary symptoms: none.     Advanced Care Planning:  - Has an advanced directive?: no    - Has a durable medical POA?: no      Review of Systems   All other systems reviewed and are negative.        Objective   Ht 5' 4\" (1.626 m)   BMI 44.29 kg/m²     Physical Exam  Vitals and nursing note reviewed. "   Constitutional:       General: He is not in acute distress.     Appearance: Normal appearance. He is well-developed. He is not ill-appearing, toxic-appearing or diaphoretic.   HENT:      Head: Normocephalic and atraumatic.      Right Ear: External ear normal.      Left Ear: External ear normal.      Nose: Nose normal.      Mouth/Throat:      Mouth: Mucous membranes are moist.      Pharynx: Oropharynx is clear. No oropharyngeal exudate or posterior oropharyngeal erythema.     Eyes:      General: No scleral icterus.        Right eye: No discharge.         Left eye: No discharge.      Extraocular Movements: Extraocular movements intact.      Conjunctiva/sclera: Conjunctivae normal.      Pupils: Pupils are equal, round, and reactive to light.       Cardiovascular:      Rate and Rhythm: Normal rate and regular rhythm.      Pulses: Normal pulses.      Heart sounds: Normal heart sounds. No murmur heard.  Pulmonary:      Effort: Pulmonary effort is normal. No respiratory distress.      Breath sounds: Normal breath sounds.   Abdominal:      Palpations: Abdomen is soft.     Musculoskeletal:         General: Normal range of motion.      Cervical back: Normal range of motion and neck supple.      Right lower leg: No edema.      Left lower leg: No edema.   Lymphadenopathy:      Cervical: No cervical adenopathy.     Skin:     General: Skin is warm and dry.     Neurological:      General: No focal deficit present.      Mental Status: He is alert and oriented to person, place, and time.      Cranial Nerves: No cranial nerve deficit.      Motor: No weakness.      Gait: Gait normal.     Psychiatric:         Mood and Affect: Mood normal.         Behavior: Behavior normal.         Thought Content: Thought content normal.         Judgment: Judgment normal.

## 2025-06-12 NOTE — PATIENT INSTRUCTIONS
"Patient Education     Routine physical for adults   The Basics   Written by the doctors and editors at [facility]   What is a physical? -- A physical is a routine visit, or \"check-up,\" with your doctor. You might also hear it called a \"wellness visit\" or \"preventive visit.\"  During each visit, the doctor will:   Ask about your physical and mental health   Ask about your habits, behaviors, and lifestyle   Do an exam   Give you vaccines if needed   Talk to you about any medicines you take   Give advice about your health   Answer your questions  Getting regular check-ups is an important part of taking care of your health. It can help your doctor find and treat any problems you have. But it's also important for preventing health problems.  A routine physical is different from a \"sick visit.\" A sick visit is when you see a doctor because of a health concern or problem. Since physicals are scheduled ahead of time, you can think about what you want to ask the doctor.  How often should I get a physical? -- It depends on your age and health. In general, for people age 21 years and older:   If you are younger than 50 years, you might be able to get a physical every 3 years.   If you are 50 years or older, your doctor might recommend a physical every year.  If you have an ongoing health condition, like diabetes or high blood pressure, your doctor will probably want to see you more often.  What happens during a physical? -- In general, each visit will include:   Physical exam - The doctor or nurse will check your height, weight, heart rate, and blood pressure. They will also look at your eyes and ears. They will ask about how you are feeling and whether you have any symptoms that bother you.   Medicines - It's a good idea to bring a list of all the medicines you take to each doctor visit. Your doctor will talk to you about your medicines and answer any questions. Tell them if you are having any side effects that bother you. You " "should also tell them if you are having trouble paying for any of your medicines.   Habits and behaviors - This includes:   Your diet   Your exercise habits   Whether you smoke, drink alcohol, or use drugs   Whether you are sexually active   Whether you feel safe at home  Your doctor will talk to you about things you can do to improve your health and lower your risk of health problems. They will also offer help and support. For example, if you want to quit smoking, they can give you advice and might prescribe medicines. If you want to improve your diet or get more physical activity, they can help you with this, too.   Lab tests, if needed - The tests you get will depend on your age and situation. For example, your doctor might want to check your:   Cholesterol   Blood sugar   Iron level   Vaccines - The recommended vaccines will depend on your age, health, and what vaccines you already had. Vaccines are very important because they can prevent certain serious or deadly infections.   Discussion of screening - \"Screening\" means checking for diseases or other health problems before they cause symptoms. Your doctor can recommend screening based on your age, risk, and preferences. This might include tests to check for:   Cancer, such as breast, prostate, cervical, ovarian, colorectal, prostate, lung, or skin cancer   Sexually transmitted infections, such as chlamydia and gonorrhea   Mental health conditions like depression and anxiety  Your doctor will talk to you about the different types of screening tests. They can help you decide which screenings to have. They can also explain what the results might mean.   Answering questions - The physical is a good time to ask the doctor or nurse questions about your health. If needed, they can refer you to other doctors or specialists, too.  Adults older than 65 years often need other care, too. As you get older, your doctor will talk to you about:   How to prevent falling at " home   Hearing or vision tests   Memory testing   How to take your medicines safely   Making sure that you have the help and support you need at home  All topics are updated as new evidence becomes available and our peer review process is complete.  This topic retrieved from [employer] on: May 02, 2024.  Topic 761003 Version 1.0  Release: 32.4.3 - C32.122  © 2024 UpToDate, Inc. and/or its affiliates. All rights reserved.  Consumer Information Use and Disclaimer   Disclaimer: This generalized information is a limited summary of diagnosis, treatment, and/or medication information. It is not meant to be comprehensive and should be used as a tool to help the user understand and/or assess potential diagnostic and treatment options. It does NOT include all information about conditions, treatments, medications, side effects, or risks that may apply to a specific patient. It is not intended to be medical advice or a substitute for the medical advice, diagnosis, or treatment of a health care provider based on the health care provider's examination and assessment of a patient's specific and unique circumstances. Patients must speak with a health care provider for complete information about their health, medical questions, and treatment options, including any risks or benefits regarding use of medications. This information does not endorse any treatments or medications as safe, effective, or approved for treating a specific patient. UpToDate, Inc. and its affiliates disclaim any warranty or liability relating to this information or the use thereof.The use of this information is governed by the Terms of Use, available at https://www.w[employer]ters[employer]culo Therapyuwer.com/en/know/clinical-effectiveness-terms. 2024© UpToDate, Inc. and its affiliates and/or licensors. All rights reserved.  Copyright   © 2024 UpToDate, Inc. and/or its affiliates. All rights reserved.

## 2025-06-13 NOTE — TELEPHONE ENCOUNTER
Incoming call from patient who read sleep study results and message from Dr. Travis about next steps.     Replacement CPAP ordered, patient would like to stay with [facility].     Compliance appointment scheduled for 8/25/2025 @ 3:30 pm with Dr. Travis in the [place] office.     Patient added to Epic secure chat teams DME set up.

## 2025-06-14 NOTE — PROGRESS NOTES
Name: Shady Bethea      : 1975      MRN: [id number]  Encounter Provider: Neftaly Arvizu MD  Encounter Date: 2025   Encounter department: [facility]    :  Assessment & Plan  Dyslipidemia    Orders:    rosuvastatin (CRESTOR) 5 mg tablet; Take 1 tablet (5 mg total) by mouth daily    Lipid panel; Future    Annual physical exam         Viral URI with cough           Assessment & Plan  1. Hyperlipidemia.  - Elevated total cholesterol and LDL levels, with an LDL of 157.  - History of high cholesterol levels since .  - Potential side effects of statins, including joint pain and myalgias, were discussed. Advised to avoid grapefruit while on this medication.  - Prescription for Crestor 5 mg daily provided. Repeat lipid panel in 3 months. Dosage will be increased from 5 mg to 10 mg if cholesterol levels remain high.    2. Viral upper respiratory infection.  - Clear lungs, indicating no signs of pneumonia.  - Advised to continue supportive care and over-the-counter medications.  - If condition worsens, contact the office for a prescription for Z-Shimon to be sent to the pharmacy.    3. Health maintenance.  - Due for a pneumonia vaccine, but will be discussed at the next visit due to current cold and cough.    Follow-up  - Follow-up in 3 months.           History of Present Illness     History of Present Illness  The patient presents for an annual exam and follow-up for lab work.    He has been experiencing a cough since  evening, accompanied by mucus congestion. He reports no known exposure to COVID-19 or influenza and has not had any fevers. Initially, symptoms were improving, but he experienced a regression as the day progressed. He felt better upon waking this morning, but his condition has since deteriorated.    Discussion of lab results reveals elevated cholesterol levels, specifically high total cholesterol and LDL. He reports fasting for the most recent lab work, which was  "conducted earlier in the day. Previous lab results from 2020 and 2015 also indicated high cholesterol levels, with LDL values of 157 and 145, respectively. He acknowledges the need for treatment and is open to starting a statin medication, specifically Crestor 5 mg.    He expresses a desire to lose weight and recalls being prescribed a statin medication following a stroke, which he tolerated well without any side effects. He does not remember having high cholesterol levels at that time but was informed that the statin was prescribed as a preventive measure against future strokes. He also mentions that he was previously informed of elevated cholesterol levels, but subsequent fasting and retesting showed normal results.     Review of Systems  Objective   /94 (BP Location: Left arm, Patient Position: Sitting, Cuff Size: Large)   Pulse 95   Temp 98.3 °F (36.8 °C) (Tympanic)   Resp 18   Ht 5' 4\" (1.626 m)   Wt 115 kg (253 lb 9.6 oz)   SpO2 97%   BMI 43.53 kg/m²     Physical Exam  Neurological: Awake, alert, oriented x4, no focal deficit  Head: Normocephalic, atraumatic  Ears: External ear canals and tympanic membranes intact  Eyes: Pupils equal and round, conjunctivae clear  Nose: Septum midline, nares patent, mucosa normal  Mouth/Throat: Mucous membranes moist, no erythema, no exudate  Neck: Supple, no abnormalities  Respiratory: Clear to auscultation, no wheezing, rales or rhonchi  Cardiovascular: Regular rate and rhythm, no murmurs, rubs, or gallops  Extremities: No edema, no cyanosis  Musculoskeletal: No joint or muscular abnormalities noted  Skin: No abnormalities, no rashes or lesions  Other: Blood pressure is 118/94 today  Physical Exam    "

## 2025-06-16 ENCOUNTER — TELEPHONE (OUTPATIENT)
Dept: SLEEP CENTER | Facility: CLINIC | Age: 50
End: 2025-06-16

## 2025-06-16 LAB
